# Patient Record
Sex: FEMALE | Race: WHITE | NOT HISPANIC OR LATINO | Employment: OTHER | URBAN - METROPOLITAN AREA
[De-identification: names, ages, dates, MRNs, and addresses within clinical notes are randomized per-mention and may not be internally consistent; named-entity substitution may affect disease eponyms.]

---

## 2023-03-03 LAB
ALANINE AMINOTRANSFERASE (SGPT) (U/L) IN SER/PLAS: 17 U/L (ref 7–45)
ALBUMIN (G/DL) IN SER/PLAS: 4.2 G/DL (ref 3.4–5)
ALKALINE PHOSPHATASE (U/L) IN SER/PLAS: 103 U/L (ref 33–136)
ANION GAP IN SER/PLAS: 15 MMOL/L (ref 10–20)
ASPARTATE AMINOTRANSFERASE (SGOT) (U/L) IN SER/PLAS: 21 U/L (ref 9–39)
BASOPHILS (10*3/UL) IN BLOOD BY AUTOMATED COUNT: 0.03 X10E9/L (ref 0–0.1)
BASOPHILS/100 LEUKOCYTES IN BLOOD BY AUTOMATED COUNT: 0.6 % (ref 0–2)
BILIRUBIN TOTAL (MG/DL) IN SER/PLAS: 1.1 MG/DL (ref 0–1.2)
CALCIDIOL (25 OH VITAMIN D3) (NG/ML) IN SER/PLAS: 49 NG/ML
CALCIUM (MG/DL) IN SER/PLAS: 9.2 MG/DL (ref 8.6–10.6)
CARBON DIOXIDE, TOTAL (MMOL/L) IN SER/PLAS: 30 MMOL/L (ref 21–32)
CHLORIDE (MMOL/L) IN SER/PLAS: 101 MMOL/L (ref 98–107)
CREATININE (MG/DL) IN SER/PLAS: 0.98 MG/DL (ref 0.5–1.05)
EOSINOPHILS (10*3/UL) IN BLOOD BY AUTOMATED COUNT: 0.08 X10E9/L (ref 0–0.4)
EOSINOPHILS/100 LEUKOCYTES IN BLOOD BY AUTOMATED COUNT: 1.5 % (ref 0–6)
ERYTHROCYTE DISTRIBUTION WIDTH (RATIO) BY AUTOMATED COUNT: 13.8 % (ref 11.5–14.5)
ERYTHROCYTE MEAN CORPUSCULAR HEMOGLOBIN CONCENTRATION (G/DL) BY AUTOMATED: 31.2 G/DL (ref 32–36)
ERYTHROCYTE MEAN CORPUSCULAR VOLUME (FL) BY AUTOMATED COUNT: 92 FL (ref 80–100)
ERYTHROCYTES (10*6/UL) IN BLOOD BY AUTOMATED COUNT: 4.28 X10E12/L (ref 4–5.2)
ESTIMATED AVERAGE GLUCOSE FOR HBA1C: 126 MG/DL
GFR FEMALE: 57 ML/MIN/1.73M2
GLUCOSE (MG/DL) IN SER/PLAS: 96 MG/DL (ref 74–99)
HEMATOCRIT (%) IN BLOOD BY AUTOMATED COUNT: 39.4 % (ref 36–46)
HEMOGLOBIN (G/DL) IN BLOOD: 12.3 G/DL (ref 12–16)
HEMOGLOBIN A1C/HEMOGLOBIN TOTAL IN BLOOD: 6 %
IMMATURE GRANULOCYTES/100 LEUKOCYTES IN BLOOD BY AUTOMATED COUNT: 0.2 % (ref 0–0.9)
LEUKOCYTES (10*3/UL) IN BLOOD BY AUTOMATED COUNT: 5.3 X10E9/L (ref 4.4–11.3)
LYMPHOCYTES (10*3/UL) IN BLOOD BY AUTOMATED COUNT: 1.59 X10E9/L (ref 0.8–3)
LYMPHOCYTES/100 LEUKOCYTES IN BLOOD BY AUTOMATED COUNT: 29.9 % (ref 13–44)
MONOCYTES (10*3/UL) IN BLOOD BY AUTOMATED COUNT: 0.37 X10E9/L (ref 0.05–0.8)
MONOCYTES/100 LEUKOCYTES IN BLOOD BY AUTOMATED COUNT: 7 % (ref 2–10)
NEUTROPHILS (10*3/UL) IN BLOOD BY AUTOMATED COUNT: 3.23 X10E9/L (ref 1.6–5.5)
NEUTROPHILS/100 LEUKOCYTES IN BLOOD BY AUTOMATED COUNT: 60.8 % (ref 40–80)
NRBC (PER 100 WBCS) BY AUTOMATED COUNT: 0 /100 WBC (ref 0–0)
PLATELETS (10*3/UL) IN BLOOD AUTOMATED COUNT: 206 X10E9/L (ref 150–450)
POTASSIUM (MMOL/L) IN SER/PLAS: 4.1 MMOL/L (ref 3.5–5.3)
PROTEIN TOTAL: 7.1 G/DL (ref 6.4–8.2)
SODIUM (MMOL/L) IN SER/PLAS: 142 MMOL/L (ref 136–145)
THYROTROPIN (MIU/L) IN SER/PLAS BY DETECTION LIMIT <= 0.05 MIU/L: 1.21 MIU/L (ref 0.44–3.98)
UREA NITROGEN (MG/DL) IN SER/PLAS: 27 MG/DL (ref 6–23)

## 2023-03-16 ENCOUNTER — TELEPHONE (OUTPATIENT)
Dept: PRIMARY CARE | Facility: CLINIC | Age: 83
End: 2023-03-16
Payer: COMMERCIAL

## 2023-03-16 DIAGNOSIS — G47.00 INSOMNIA, UNSPECIFIED TYPE: Primary | ICD-10-CM

## 2023-03-16 RX ORDER — TRAZODONE HYDROCHLORIDE 50 MG/1
50 TABLET ORAL NIGHTLY PRN
Qty: 30 TABLET | Refills: 0 | Status: SHIPPED | OUTPATIENT
Start: 2023-03-16 | End: 2023-04-27 | Stop reason: SINTOL

## 2023-03-16 NOTE — TELEPHONE ENCOUNTER
Pt informed of results.    ----- Message from Marlene Nguyễn PA-C sent at 3/9/2023 10:03 PM EST -----  Regarding: Labs  Labs all look great except:  -hemoglobin A1c is in prediabetic range.  Keep conscious of any sugary drinks/foods in diet.  Continue exercising as tolerated  -kidney function remains decreased but very stable.  Drink adequate water, limit NSAIDs

## 2023-03-21 ENCOUNTER — TELEPHONE (OUTPATIENT)
Dept: PRIMARY CARE | Facility: CLINIC | Age: 83
End: 2023-03-21
Payer: COMMERCIAL

## 2023-03-21 NOTE — TELEPHONE ENCOUNTER
Left pt a detailed message regarding next plan of care.    Pt stated that trazodone is not working.   Can she take melatonin with prescribed med?  Please assist.

## 2023-03-23 ENCOUNTER — TELEPHONE (OUTPATIENT)
Dept: PRIMARY CARE | Facility: CLINIC | Age: 83
End: 2023-03-23
Payer: COMMERCIAL

## 2023-03-23 NOTE — TELEPHONE ENCOUNTER
Pt informed of results.    ----- Message from Marlene Nguyễn PA-C sent at 3/17/2023  5:15 PM EDT -----  Regarding: DEXA results  DEXA bone scan results show normal bone density.   I recommend she continue calcium 600mg + vitamin D 400U twice daily  Participate in weight bearing exercises as tolerated  Thanks

## 2023-04-24 ENCOUNTER — TELEPHONE (OUTPATIENT)
Dept: PRIMARY CARE | Facility: CLINIC | Age: 83
End: 2023-04-24
Payer: COMMERCIAL

## 2023-04-24 NOTE — TELEPHONE ENCOUNTER
Was taking trazadone, however stopped due to stomach pains and lightheadedness.  When she was out of town, she took lorazepam 1/2 at bedtime along with melatonin and it helped her sleep.  Wants to know if you can give her more lorazepam and will only take 1/2 at bedtime.

## 2023-04-25 DIAGNOSIS — G47.00 INSOMNIA, UNSPECIFIED TYPE: Primary | ICD-10-CM

## 2023-04-27 RX ORDER — RAMELTEON 8 MG/1
8 TABLET ORAL NIGHTLY
Qty: 30 TABLET | Refills: 0 | Status: SHIPPED | OUTPATIENT
Start: 2023-04-27 | End: 2023-06-01

## 2023-06-01 ENCOUNTER — OFFICE VISIT (OUTPATIENT)
Dept: PRIMARY CARE | Facility: CLINIC | Age: 83
End: 2023-06-01
Payer: MEDICARE

## 2023-06-01 VITALS
OXYGEN SATURATION: 97 % | HEIGHT: 63 IN | WEIGHT: 185.9 LBS | DIASTOLIC BLOOD PRESSURE: 70 MMHG | HEART RATE: 76 BPM | BODY MASS INDEX: 32.94 KG/M2 | SYSTOLIC BLOOD PRESSURE: 140 MMHG

## 2023-06-01 DIAGNOSIS — I10 BENIGN ESSENTIAL HYPERTENSION: Primary | ICD-10-CM

## 2023-06-01 DIAGNOSIS — E03.9 HYPOTHYROIDISM, UNSPECIFIED TYPE: ICD-10-CM

## 2023-06-01 DIAGNOSIS — G47.00 INSOMNIA, UNSPECIFIED TYPE: ICD-10-CM

## 2023-06-01 DIAGNOSIS — I48.91 ATRIAL FIBRILLATION, UNSPECIFIED TYPE (MULTI): ICD-10-CM

## 2023-06-01 PROBLEM — N39.0 URINARY TRACT INFECTION: Status: ACTIVE | Noted: 2023-06-01

## 2023-06-01 PROBLEM — L60.0 ONYCHOCRYPTOSIS: Status: ACTIVE | Noted: 2017-01-11

## 2023-06-01 PROBLEM — R35.0 INCREASED FREQUENCY OF URINATION: Status: ACTIVE | Noted: 2023-06-01

## 2023-06-01 PROBLEM — H66.90 OTITIS MEDIA: Status: ACTIVE | Noted: 2019-04-15

## 2023-06-01 PROBLEM — E78.00 PURE HYPERCHOLESTEROLEMIA: Status: ACTIVE | Noted: 2019-04-15

## 2023-06-01 PROBLEM — M54.9 BACKACHE: Status: ACTIVE | Noted: 2019-04-15

## 2023-06-01 PROBLEM — R73.9 HYPERGLYCEMIA: Status: ACTIVE | Noted: 2023-06-01

## 2023-06-01 PROBLEM — R30.0 DYSURIA: Status: ACTIVE | Noted: 2019-04-15

## 2023-06-01 PROBLEM — I65.22 STENOSIS OF LEFT INTERNAL CAROTID ARTERY: Status: ACTIVE | Noted: 2023-06-01

## 2023-06-01 PROBLEM — N95.2 ATROPHIC VAGINITIS: Status: ACTIVE | Noted: 2023-06-01

## 2023-06-01 PROBLEM — I65.29 OCCLUSION AND STENOSIS OF UNSPECIFIED CAROTID ARTERY: Status: ACTIVE | Noted: 2023-06-01

## 2023-06-01 PROBLEM — M79.671 PAIN IN RIGHT FOOT: Status: ACTIVE | Noted: 2017-01-11

## 2023-06-01 PROBLEM — M25.559 HIP PAIN: Status: ACTIVE | Noted: 2019-04-15

## 2023-06-01 PROBLEM — R53.83 FATIGUE: Status: ACTIVE | Noted: 2023-06-01

## 2023-06-01 PROBLEM — J01.90 ACUTE SINUSITIS: Status: ACTIVE | Noted: 2019-04-15

## 2023-06-01 PROBLEM — M54.50 LOW BACK PAIN: Status: ACTIVE | Noted: 2023-06-01

## 2023-06-01 PROBLEM — Z95.0 PACEMAKER: Status: ACTIVE | Noted: 2023-06-01

## 2023-06-01 PROBLEM — F41.9 ANXIETY: Status: ACTIVE | Noted: 2023-06-01

## 2023-06-01 PROBLEM — M15.9 GENERALIZED OSTEOARTHRITIS OF MULTIPLE SITES: Status: ACTIVE | Noted: 2023-06-01

## 2023-06-01 PROBLEM — R60.9 EDEMA: Status: ACTIVE | Noted: 2023-06-01

## 2023-06-01 PROBLEM — K59.00 CONSTIPATION: Status: ACTIVE | Noted: 2023-06-01

## 2023-06-01 PROBLEM — R20.2 PARESTHESIA OF HAND: Status: ACTIVE | Noted: 2023-06-01

## 2023-06-01 PROBLEM — R53.83 MALAISE AND FATIGUE: Status: ACTIVE | Noted: 2019-04-15

## 2023-06-01 PROBLEM — M17.9 OSTEOARTHRITIS OF KNEE: Status: ACTIVE | Noted: 2019-04-15

## 2023-06-01 PROBLEM — M79.2 NEURALGIA: Status: ACTIVE | Noted: 2023-06-01

## 2023-06-01 PROBLEM — R55 SYNCOPAL EPISODES: Status: ACTIVE | Noted: 2023-06-01

## 2023-06-01 PROBLEM — R51.9 HEADACHE: Status: ACTIVE | Noted: 2019-04-15

## 2023-06-01 PROBLEM — J02.9 ACUTE PHARYNGITIS: Status: ACTIVE | Noted: 2019-04-15

## 2023-06-01 PROBLEM — R07.9 CHEST PAIN: Status: ACTIVE | Noted: 2019-04-15

## 2023-06-01 PROBLEM — N28.1 RENAL CYST: Status: ACTIVE | Noted: 2023-06-01

## 2023-06-01 PROBLEM — I34.0 MITRAL REGURGITATION: Status: ACTIVE | Noted: 2023-06-01

## 2023-06-01 PROBLEM — R03.0 ELEVATED BLOOD PRESSURE READING: Status: ACTIVE | Noted: 2023-06-01

## 2023-06-01 PROBLEM — I47.19 PAROXYSMAL ATRIAL TACHYCARDIA (CMS-HCC): Status: ACTIVE | Noted: 2023-06-01

## 2023-06-01 PROBLEM — B35.1 ONYCHOMYCOSIS: Status: ACTIVE | Noted: 2023-03-15

## 2023-06-01 PROBLEM — I47.10 SUPRAVENTRICULAR TACHYCARDIA (CMS-HCC): Status: ACTIVE | Noted: 2023-06-01

## 2023-06-01 PROBLEM — R06.00 DYSPNEA: Status: ACTIVE | Noted: 2023-06-01

## 2023-06-01 PROBLEM — H52.7 UNSPECIFIED DISORDER OF REFRACTION: Status: ACTIVE | Noted: 2023-06-01

## 2023-06-01 PROBLEM — R05.9 COUGH: Status: ACTIVE | Noted: 2019-04-15

## 2023-06-01 PROBLEM — B35.1 ONYCHOMYCOSIS DUE TO DERMATOPHYTE: Status: ACTIVE | Noted: 2017-01-11

## 2023-06-01 PROBLEM — I49.5 SINUS BRADY-TACHY SYNDROME (MULTI): Status: ACTIVE | Noted: 2023-06-01

## 2023-06-01 PROBLEM — M81.0 OSTEOPOROSIS: Status: ACTIVE | Noted: 2019-04-15

## 2023-06-01 PROBLEM — M54.2 CERVICALGIA: Status: ACTIVE | Noted: 2023-06-01

## 2023-06-01 PROBLEM — M25.50 JOINT PAIN: Status: ACTIVE | Noted: 2023-06-01

## 2023-06-01 PROBLEM — R10.2 SUPRAPUBIC PRESSURE: Status: ACTIVE | Noted: 2023-06-01

## 2023-06-01 PROBLEM — N18.9 CHRONIC KIDNEY DISEASE: Status: ACTIVE | Noted: 2023-06-01

## 2023-06-01 PROBLEM — J20.9 ACUTE BRONCHITIS: Status: ACTIVE | Noted: 2019-04-15

## 2023-06-01 PROBLEM — I67.9 CEREBRAL VASCULAR INSUFFICIENCY: Status: ACTIVE | Noted: 2023-06-01

## 2023-06-01 PROBLEM — R53.81 MALAISE AND FATIGUE: Status: ACTIVE | Noted: 2019-04-15

## 2023-06-01 PROCEDURE — 1160F RVW MEDS BY RX/DR IN RCRD: CPT | Performed by: PHYSICIAN ASSISTANT

## 2023-06-01 PROCEDURE — 1159F MED LIST DOCD IN RCRD: CPT | Performed by: PHYSICIAN ASSISTANT

## 2023-06-01 PROCEDURE — 3078F DIAST BP <80 MM HG: CPT | Performed by: PHYSICIAN ASSISTANT

## 2023-06-01 PROCEDURE — 99213 OFFICE O/P EST LOW 20 MIN: CPT | Performed by: PHYSICIAN ASSISTANT

## 2023-06-01 PROCEDURE — 3077F SYST BP >= 140 MM HG: CPT | Performed by: PHYSICIAN ASSISTANT

## 2023-06-01 PROCEDURE — 1036F TOBACCO NON-USER: CPT | Performed by: PHYSICIAN ASSISTANT

## 2023-06-01 RX ORDER — CALCIUM CARBONATE 600 MG
1 TABLET ORAL 2 TIMES DAILY
COMMUNITY
Start: 2016-02-01

## 2023-06-01 RX ORDER — ACETAMINOPHEN, DIPHENHYDRAMINE HCL, PHENYLEPHRINE HCL 325; 25; 5 MG/1; MG/1; MG/1
2 TABLET ORAL NIGHTLY
COMMUNITY
Start: 2021-01-11

## 2023-06-01 RX ORDER — LEVOTHYROXINE SODIUM 75 UG/1
75 TABLET ORAL
COMMUNITY
Start: 2021-11-17 | End: 2023-06-01 | Stop reason: SDUPTHER

## 2023-06-01 RX ORDER — ACETAMINOPHEN, DIPHENHYDRAMINE HCL, PHENYLEPHRINE HCL 325; 25; 5 MG/1; MG/1; MG/1
5000 TABLET ORAL
COMMUNITY
Start: 2019-03-18

## 2023-06-01 RX ORDER — APIXABAN 5 MG/1
5 TABLET, FILM COATED ORAL 2 TIMES DAILY
COMMUNITY
Start: 2017-11-20 | End: 2024-03-06 | Stop reason: SDUPTHER

## 2023-06-01 RX ORDER — LEVOTHYROXINE SODIUM 75 UG/1
75 TABLET ORAL
Qty: 90 TABLET | Refills: 1 | Status: SHIPPED | OUTPATIENT
Start: 2023-06-01 | End: 2023-11-01 | Stop reason: SDUPTHER

## 2023-06-01 RX ORDER — ATORVASTATIN CALCIUM 20 MG/1
20 TABLET, FILM COATED ORAL DAILY
COMMUNITY
Start: 2013-05-22 | End: 2023-12-07 | Stop reason: SDUPTHER

## 2023-06-01 RX ORDER — AMIODARONE HYDROCHLORIDE 200 MG/1
200 TABLET ORAL DAILY
COMMUNITY
Start: 2018-06-12 | End: 2024-04-22

## 2023-06-01 RX ORDER — DOCUSATE SODIUM 100 MG/1
1 CAPSULE, LIQUID FILLED ORAL 2 TIMES DAILY PRN
COMMUNITY
Start: 2022-03-31

## 2023-06-01 RX ORDER — MULTIVIT-MIN/FA/LYCOPEN/LUTEIN .4-300-25
1 TABLET ORAL DAILY
COMMUNITY
Start: 2015-05-05

## 2023-06-01 RX ORDER — TORSEMIDE 20 MG/1
1.5 TABLET ORAL DAILY
COMMUNITY
Start: 2018-05-15 | End: 2024-04-15 | Stop reason: SDUPTHER

## 2023-06-01 RX ORDER — METOPROLOL SUCCINATE 25 MG/1
25 TABLET, EXTENDED RELEASE ORAL DAILY
COMMUNITY
Start: 2019-02-09 | End: 2024-02-05 | Stop reason: SDUPTHER

## 2023-06-01 RX ORDER — ACETAMINOPHEN 500 MG
2 TABLET ORAL DAILY
COMMUNITY

## 2023-06-01 RX ORDER — CANDESARTAN 32 MG/1
32 TABLET ORAL DAILY
COMMUNITY
Start: 2019-09-25

## 2023-06-01 ASSESSMENT — PATIENT HEALTH QUESTIONNAIRE - PHQ9
1. LITTLE INTEREST OR PLEASURE IN DOING THINGS: NOT AT ALL
SUM OF ALL RESPONSES TO PHQ9 QUESTIONS 1 AND 2: 0
2. FEELING DOWN, DEPRESSED OR HOPELESS: NOT AT ALL

## 2023-06-01 NOTE — PROGRESS NOTES
"Subjective   Laurie Ozuna is a 82 y.o. female who presents for Follow-up.  HPI Laurie Ozuna is a 82 y.o. female presenting for a follow up. Generally doing well. Currently c/o:    L hip pain: states she had some difficulty with L hip pain a few days ago after wearing sandals. She states the sandals have a poor arch, unlike her normal shoes. After switching back to her normal shoes the next day the pain resolved. She notes her son is getting  next weekend and she will have to wear the sandals again unfortunately.     HTN, HLD, Afib: compliant with current regimen, including eliquis. Follows with cardiology, last seen 3/27/23.  Denies any headache, dizziness, chest pain, SOB/PATTERSON, palpitations, LE edema. No abnormal bleeding or bruising.     Hypothyroidism: stable on levothyroxine 75mcg.     Insomnia: Recall she failed trazodone after developing abdominal cramping. She was called in doxepin and ramelteon to trial, but they were both too expensive. She was previously on lorazepam, however we discontinued it d/t long term use. States she is currently taking 3, 10mg melatonin gummies per night (30mg total). We discussed that the max dose is 10mg and that she is at risk for overdose.     12 point ROS reviewed and negative other than as stated in HPI    /70   Pulse 76   Ht 1.6 m (5' 3\")   Wt 84.3 kg (185 lb 14.4 oz)   SpO2 97%   BMI 32.93 kg/m²   Objective   Physical Exam  Vitals reviewed.   Constitutional:       General: She is not in acute distress.     Appearance: Normal appearance. She is not ill-appearing.   HENT:      Head: Normocephalic and atraumatic.   Eyes:      General: No scleral icterus.     Extraocular Movements: Extraocular movements intact.      Conjunctiva/sclera: Conjunctivae normal.      Pupils: Pupils are equal, round, and reactive to light.   Cardiovascular:      Rate and Rhythm: Normal rate and regular rhythm.      Heart sounds: Normal heart sounds. No murmur heard.     No friction " rub. No gallop.   Pulmonary:      Effort: Pulmonary effort is normal. No respiratory distress.      Breath sounds: Normal breath sounds. No stridor. No wheezing, rhonchi or rales.   Musculoskeletal:      Cervical back: Normal range of motion.      Right lower leg: No edema.      Left lower leg: No edema.   Skin:     General: Skin is warm and dry.   Neurological:      Mental Status: She is alert and oriented to person, place, and time. Mental status is at baseline.      Cranial Nerves: No cranial nerve deficit.      Gait: Gait normal.   Psychiatric:         Mood and Affect: Mood normal.         Behavior: Behavior normal.         Assessment/Plan   Problem List Items Addressed This Visit       Benign essential hypertension - Primary     Acceptable for age. Continue current medications unchanged. Follow a strict DASH diet.          Hypothyroidism     Continue levothyroxine 75mcg.          Relevant Medications    levothyroxine (Synthroid, Levoxyl) 75 mcg tablet    Insomnia     STOP taking melatonin 30mg. Discussed max melatonin is 10mg. Offered trial of sertraline or referral to sleep medicine, declined for now.          Atrial fibrillation (CMS/HCC)     Stable on current regimen. Continue all medications, including eliquis. Follow with cardiology         Relevant Medications    metoprolol succinate XL (Toprol-XL) 25 mg 24 hr tablet        Follow up in 6 months or sooner as needed

## 2023-06-04 PROBLEM — R10.2 SUPRAPUBIC PRESSURE: Status: RESOLVED | Noted: 2023-06-01 | Resolved: 2023-06-04

## 2023-06-04 PROBLEM — M54.9 BACKACHE: Status: RESOLVED | Noted: 2019-04-15 | Resolved: 2023-06-04

## 2023-06-04 PROBLEM — R03.0 ELEVATED BLOOD PRESSURE READING: Status: RESOLVED | Noted: 2023-06-01 | Resolved: 2023-06-04

## 2023-06-04 PROBLEM — J01.90 ACUTE SINUSITIS: Status: RESOLVED | Noted: 2019-04-15 | Resolved: 2023-06-04

## 2023-06-04 PROBLEM — R30.0 DYSURIA: Status: RESOLVED | Noted: 2019-04-15 | Resolved: 2023-06-04

## 2023-06-04 PROBLEM — J02.9 ACUTE PHARYNGITIS: Status: RESOLVED | Noted: 2019-04-15 | Resolved: 2023-06-04

## 2023-06-04 PROBLEM — N39.0 URINARY TRACT INFECTION: Status: RESOLVED | Noted: 2023-06-01 | Resolved: 2023-06-04

## 2023-06-04 PROBLEM — J20.9 ACUTE BRONCHITIS: Status: RESOLVED | Noted: 2019-04-15 | Resolved: 2023-06-04

## 2023-06-04 NOTE — ASSESSMENT & PLAN NOTE
STOP taking melatonin 30mg. Discussed max melatonin is 10mg. Offered trial of sertraline or referral to sleep medicine, declined for now.

## 2023-06-05 DIAGNOSIS — E03.9 HYPOTHYROIDISM, UNSPECIFIED TYPE: ICD-10-CM

## 2023-06-05 RX ORDER — LEVOTHYROXINE SODIUM 75 UG/1
75 TABLET ORAL
Qty: 90 TABLET | Refills: 1 | OUTPATIENT
Start: 2023-06-05

## 2023-09-11 PROBLEM — H02.831 DERMATOCHALASIS OF RIGHT UPPER EYELID: Status: ACTIVE | Noted: 2023-09-11

## 2023-09-11 PROBLEM — H04.129 TEAR FILM INSUFFICIENCY: Status: ACTIVE | Noted: 2023-09-11

## 2023-09-11 PROBLEM — Z96.1 ARTIFICIAL LENS PRESENT: Status: ACTIVE | Noted: 2023-09-11

## 2023-09-11 PROBLEM — H35.379 EPIRETINAL MEMBRANE: Status: ACTIVE | Noted: 2023-09-11

## 2023-09-11 PROBLEM — H02.834 DERMATOCHALASIS OF LEFT UPPER EYELID: Status: ACTIVE | Noted: 2023-09-11

## 2023-09-16 PROBLEM — E66.9 CLASS 1 OBESITY WITH BODY MASS INDEX (BMI) OF 32.0 TO 32.9 IN ADULT: Status: ACTIVE | Noted: 2023-09-16

## 2023-09-16 PROBLEM — E66.811 CLASS 1 OBESITY WITH BODY MASS INDEX (BMI) OF 32.0 TO 32.9 IN ADULT: Status: ACTIVE | Noted: 2023-09-16

## 2023-09-16 PROBLEM — E78.00 HYPERCHOLESTEREMIA: Status: ACTIVE | Noted: 2023-09-16

## 2023-09-16 RX ORDER — BISACODYL 10 MG/1
10 SUPPOSITORY RECTAL DAILY
COMMUNITY
End: 2023-11-01 | Stop reason: SDUPTHER

## 2023-09-16 RX ORDER — ESTRADIOL 0.1 MG/G
2 CREAM VAGINAL DAILY
COMMUNITY

## 2023-09-16 RX ORDER — DOXEPIN 3 MG/1
1 TABLET, FILM COATED ORAL NIGHTLY
COMMUNITY
End: 2023-11-01 | Stop reason: ALTCHOICE

## 2023-09-16 RX ORDER — LIDOCAINE 50 MG/G
1 PATCH TOPICAL DAILY
COMMUNITY

## 2023-10-18 ENCOUNTER — HOSPITAL ENCOUNTER (OUTPATIENT)
Dept: CARDIOLOGY | Facility: CLINIC | Age: 83
Discharge: HOME | End: 2023-10-18
Payer: MEDICARE

## 2023-10-18 DIAGNOSIS — I34.0 MITRAL VALVE INSUFFICIENCY, UNSPECIFIED ETIOLOGY: ICD-10-CM

## 2023-10-18 PROCEDURE — 93306 TTE W/DOPPLER COMPLETE: CPT | Performed by: INTERNAL MEDICINE

## 2023-10-18 PROCEDURE — 93306 TTE W/DOPPLER COMPLETE: CPT

## 2023-10-21 LAB — EJECTION FRACTION APICAL 4 CHAMBER: 58.1

## 2023-10-23 ENCOUNTER — HOSPITAL ENCOUNTER (OUTPATIENT)
Dept: CARDIOLOGY | Facility: CLINIC | Age: 83
Discharge: HOME | End: 2023-10-23
Payer: COMMERCIAL

## 2023-10-23 DIAGNOSIS — I49.5 SICK SINUS SYNDROME (MULTI): ICD-10-CM

## 2023-10-23 DIAGNOSIS — Z95.0 PRESENCE OF CARDIAC PACEMAKER: ICD-10-CM

## 2023-11-01 ENCOUNTER — OFFICE VISIT (OUTPATIENT)
Dept: PRIMARY CARE | Facility: CLINIC | Age: 83
End: 2023-11-01
Payer: MEDICARE

## 2023-11-01 ENCOUNTER — HOSPITAL ENCOUNTER (OUTPATIENT)
Dept: RADIOLOGY | Facility: CLINIC | Age: 83
Discharge: HOME | End: 2023-11-01
Payer: MEDICARE

## 2023-11-01 ENCOUNTER — LAB (OUTPATIENT)
Dept: LAB | Facility: LAB | Age: 83
End: 2023-11-01
Payer: COMMERCIAL

## 2023-11-01 VITALS
SYSTOLIC BLOOD PRESSURE: 143 MMHG | WEIGHT: 192 LBS | HEART RATE: 61 BPM | OXYGEN SATURATION: 98 % | BODY MASS INDEX: 34.01 KG/M2 | DIASTOLIC BLOOD PRESSURE: 79 MMHG

## 2023-11-01 DIAGNOSIS — E78.00 HYPERCHOLESTEREMIA: ICD-10-CM

## 2023-11-01 DIAGNOSIS — K59.00 CONSTIPATION, UNSPECIFIED CONSTIPATION TYPE: ICD-10-CM

## 2023-11-01 DIAGNOSIS — N18.31 STAGE 3A CHRONIC KIDNEY DISEASE (MULTI): ICD-10-CM

## 2023-11-01 DIAGNOSIS — R73.9 HYPERGLYCEMIA: ICD-10-CM

## 2023-11-01 DIAGNOSIS — R06.09 EXERTIONAL DYSPNEA: ICD-10-CM

## 2023-11-01 DIAGNOSIS — E03.9 HYPOTHYROIDISM, UNSPECIFIED TYPE: ICD-10-CM

## 2023-11-01 DIAGNOSIS — I10 BENIGN ESSENTIAL HYPERTENSION: ICD-10-CM

## 2023-11-01 DIAGNOSIS — I10 BENIGN ESSENTIAL HYPERTENSION: Primary | ICD-10-CM

## 2023-11-01 LAB
BASOPHILS # BLD AUTO: 0.04 X10*3/UL (ref 0–0.1)
BASOPHILS NFR BLD AUTO: 0.6 %
EOSINOPHIL # BLD AUTO: 0.07 X10*3/UL (ref 0–0.4)
EOSINOPHIL NFR BLD AUTO: 1.1 %
ERYTHROCYTE [DISTWIDTH] IN BLOOD BY AUTOMATED COUNT: 14.2 % (ref 11.5–14.5)
EST. AVERAGE GLUCOSE BLD GHB EST-MCNC: 126 MG/DL
HBA1C MFR BLD: 6 %
HCT VFR BLD AUTO: 39.6 % (ref 36–46)
HGB BLD-MCNC: 12 G/DL (ref 12–16)
IMM GRANULOCYTES # BLD AUTO: 0.02 X10*3/UL (ref 0–0.5)
IMM GRANULOCYTES NFR BLD AUTO: 0.3 % (ref 0–0.9)
LYMPHOCYTES # BLD AUTO: 1.58 X10*3/UL (ref 0.8–3)
LYMPHOCYTES NFR BLD AUTO: 24.5 %
MCH RBC QN AUTO: 28.4 PG (ref 26–34)
MCHC RBC AUTO-ENTMCNC: 30.3 G/DL (ref 32–36)
MCV RBC AUTO: 94 FL (ref 80–100)
MONOCYTES # BLD AUTO: 0.64 X10*3/UL (ref 0.05–0.8)
MONOCYTES NFR BLD AUTO: 9.9 %
NEUTROPHILS # BLD AUTO: 4.09 X10*3/UL (ref 1.6–5.5)
NEUTROPHILS NFR BLD AUTO: 63.6 %
NRBC BLD-RTO: 0 /100 WBCS (ref 0–0)
PLATELET # BLD AUTO: 219 X10*3/UL (ref 150–450)
RBC # BLD AUTO: 4.23 X10*6/UL (ref 4–5.2)
WBC # BLD AUTO: 6.4 X10*3/UL (ref 4.4–11.3)

## 2023-11-01 PROCEDURE — 83036 HEMOGLOBIN GLYCOSYLATED A1C: CPT

## 2023-11-01 PROCEDURE — 80061 LIPID PANEL: CPT

## 2023-11-01 PROCEDURE — 99214 OFFICE O/P EST MOD 30 MIN: CPT | Performed by: PHYSICIAN ASSISTANT

## 2023-11-01 PROCEDURE — G0008 ADMIN INFLUENZA VIRUS VAC: HCPCS | Performed by: PHYSICIAN ASSISTANT

## 2023-11-01 PROCEDURE — 1160F RVW MEDS BY RX/DR IN RCRD: CPT | Performed by: PHYSICIAN ASSISTANT

## 2023-11-01 PROCEDURE — 36415 COLL VENOUS BLD VENIPUNCTURE: CPT

## 2023-11-01 PROCEDURE — 1159F MED LIST DOCD IN RCRD: CPT | Performed by: PHYSICIAN ASSISTANT

## 2023-11-01 PROCEDURE — 3078F DIAST BP <80 MM HG: CPT | Performed by: PHYSICIAN ASSISTANT

## 2023-11-01 PROCEDURE — 85025 COMPLETE CBC W/AUTO DIFF WBC: CPT

## 2023-11-01 PROCEDURE — 1126F AMNT PAIN NOTED NONE PRSNT: CPT | Performed by: PHYSICIAN ASSISTANT

## 2023-11-01 PROCEDURE — 84443 ASSAY THYROID STIM HORMONE: CPT

## 2023-11-01 PROCEDURE — 71046 X-RAY EXAM CHEST 2 VIEWS: CPT | Performed by: RADIOLOGY

## 2023-11-01 PROCEDURE — 3077F SYST BP >= 140 MM HG: CPT | Performed by: PHYSICIAN ASSISTANT

## 2023-11-01 PROCEDURE — 80053 COMPREHEN METABOLIC PANEL: CPT

## 2023-11-01 PROCEDURE — 90662 IIV NO PRSV INCREASED AG IM: CPT | Performed by: PHYSICIAN ASSISTANT

## 2023-11-01 PROCEDURE — 71046 X-RAY EXAM CHEST 2 VIEWS: CPT

## 2023-11-01 PROCEDURE — 1036F TOBACCO NON-USER: CPT | Performed by: PHYSICIAN ASSISTANT

## 2023-11-01 RX ORDER — LEVOTHYROXINE SODIUM 75 UG/1
75 TABLET ORAL
Qty: 90 TABLET | Refills: 1 | Status: SHIPPED | OUTPATIENT
Start: 2023-11-01 | End: 2024-05-06 | Stop reason: SDUPTHER

## 2023-11-01 RX ORDER — BISACODYL 10 MG/1
10 SUPPOSITORY RECTAL DAILY
Qty: 12 SUPPOSITORY | Refills: 2 | Status: SHIPPED | OUTPATIENT
Start: 2023-11-01

## 2023-11-01 NOTE — PROGRESS NOTES
Subjective   Lauriewarner Ozuna is a 83 y.o. female who presents for a follow up. Generally doing well. Currently c/o:    Dyspnea on exertion: intermittently occurring for the past week. Currently asymptomatic. Noticed symptoms specifically with the weather change. Denies any fevers, chills, sick contacts, dizziness, cough, sputum production, wheezing, chest pain, palpitations, dyspnea at rest. Endorsed having an episode of chest tightness last week, did not seek care. Lasted a few minutes, felt similar to heartburn. She took TUMS and it resolved. No diaphoresis, arm pain, jaw pain, associated dyspnea, orthopnea, LE edema. No abnormal bleeding or bruising. Recently had an echocardiogram performed, results in EMR. Has appt next week with cardiology to review.     Feeling off balanced: occurs intermittently for the past year when she walks. She has a history of vertigo, but states it is not the same. Not a room spinning dizziness, just off balance feeling. No associated HA, tinnitus, hearing or vision changes/loss, memory changes, neuro deficits. She does not notice a difference with taking meclizine 12.5mg. Has not checked her BP when it occurs. No urinary complaints.     HTN, HLD, Afib: compliant with current regimen, including eliquis. Does check BP at home, 160/80 last week. Follows with cardiology    Hypothyroidism: stable on levothyroxine 75mcg. Endorses chronic constipation. No palpitations, abnormal weight changes, hot/ cold intolerance.     CKD, stage IIIa: not on ACE/ARB d/t allergy. No SGLT2i    Insomnia: Recall she failed trazodone after developing abdominal cramping. She was called in doxepin and ramelteon to trial, but they were both too expensive. She was previously on lorazepam, however we discontinued it d/t long term use. States she goes to bed late, falls asleep for 2-5hrs, then is wide awake. Usually when sleeping she tosses and turns often. Not taking any medications currently. Not interested in  meeting with sleep medicine.     Health maintenance:  Immunizations:  -Flu: obtain high-dose today, 11/1/23   -Pneumococcal: UTD  -Shingrix: obtain from local pharmacy  -Tdap: recommended from pharmacy   Mammogram UTD (last 3/14/23)   Colon cancer screening - not indicated d/t age  DEXA DUE (last 5/29/20) - ordered     Last MCR/ CPE: 3/2/23 (MCR ADV)    12 point ROS reviewed and negative other than as stated in HPI    /79   Pulse 61   Wt 87.1 kg (192 lb)   SpO2 98%   BMI 34.01 kg/m²   Objective   Physical Exam  Vitals reviewed.   Constitutional:       General: She is not in acute distress.     Appearance: Normal appearance. She is not ill-appearing.   HENT:      Head: Normocephalic and atraumatic.   Eyes:      General: No scleral icterus.     Extraocular Movements: Extraocular movements intact.      Conjunctiva/sclera: Conjunctivae normal.      Pupils: Pupils are equal, round, and reactive to light.   Cardiovascular:      Rate and Rhythm: Normal rate and regular rhythm.      Heart sounds: Normal heart sounds. No murmur heard.     No friction rub. No gallop.   Pulmonary:      Effort: Pulmonary effort is normal. No respiratory distress.      Breath sounds: Normal breath sounds. No stridor. No wheezing, rhonchi or rales.   Musculoskeletal:      Cervical back: Normal range of motion.      Right lower leg: No edema.      Left lower leg: No edema.   Skin:     General: Skin is warm and dry.   Neurological:      Mental Status: She is alert and oriented to person, place, and time. Mental status is at baseline.      Cranial Nerves: No cranial nerve deficit.      Gait: Gait normal.   Psychiatric:         Mood and Affect: Mood normal.         Behavior: Behavior normal.         Assessment/Plan   Problem List Items Addressed This Visit       Benign essential hypertension - Primary     Acceptable for age. Continue current medications unchanged. Follow a strict DASH diet.          Relevant Orders    Comprehensive  metabolic panel (Completed)    CBC and Auto Differential (Completed)    Constipation     Take metamucil or miralax PRN. Increase fiber and water intake. Exercise for 30 mins daily as tolerated         Relevant Medications    bisacodyl (Dulcolax, bisacodyl,) 10 mg suppository    Hyperglycemia     Hemoglobin A1c ordered. Cut back on carbohydrates and sugary drinks/foods         Relevant Orders    Hemoglobin A1c (Completed)    Hypothyroidism     Continue levothyroxine 75mcg. TFTs ordered         Relevant Medications    levothyroxine (Synthroid, Levoxyl) 75 mcg tablet    Other Relevant Orders    Tsh With Reflex To Free T4 If Abnormal (Completed)    Hypercholesteremia     Continue atorvastatin 20mg. Follow a mediterranean style diet and exercise as tolerated          Relevant Orders    Lipid panel (Completed)    Exertional dyspnea     Currently asymptomatic. Vital signs stable. Chest xray ordered. Review echocardiogram with cardiology and whether any intervention is indicated. Labs ordered.     Discussed strict precautions and to go to the ED with any worsening.          Relevant Orders    XR chest 2 views (Completed)    Stage 3a chronic kidney disease (CMS/HCC)     ACE/ARB contraindicated d/t allergy. No SGLT2 inhibitor d/t cost. Encouraged strict control of BP. Avoid NSAIDs. Drink plenty of water.            Follow up in 6 months or sooner as needed

## 2023-11-02 LAB
ALBUMIN SERPL BCP-MCNC: 4.3 G/DL (ref 3.4–5)
ALP SERPL-CCNC: 99 U/L (ref 33–136)
ALT SERPL W P-5'-P-CCNC: 19 U/L (ref 7–45)
ANION GAP SERPL CALC-SCNC: 15 MMOL/L (ref 10–20)
AST SERPL W P-5'-P-CCNC: 20 U/L (ref 9–39)
BILIRUB SERPL-MCNC: 0.9 MG/DL (ref 0–1.2)
BUN SERPL-MCNC: 25 MG/DL (ref 6–23)
CALCIUM SERPL-MCNC: 9.5 MG/DL (ref 8.6–10.6)
CHLORIDE SERPL-SCNC: 103 MMOL/L (ref 98–107)
CHOLEST SERPL-MCNC: 130 MG/DL (ref 0–199)
CHOLESTEROL/HDL RATIO: 2.2
CO2 SERPL-SCNC: 29 MMOL/L (ref 21–32)
CREAT SERPL-MCNC: 1.12 MG/DL (ref 0.5–1.05)
GFR SERPL CREATININE-BSD FRML MDRD: 49 ML/MIN/1.73M*2
GLUCOSE SERPL-MCNC: 114 MG/DL (ref 74–99)
HDLC SERPL-MCNC: 59.1 MG/DL
LDLC SERPL CALC-MCNC: 56 MG/DL
NON HDL CHOLESTEROL: 71 MG/DL (ref 0–149)
POTASSIUM SERPL-SCNC: 5 MMOL/L (ref 3.5–5.3)
PROT SERPL-MCNC: 7.4 G/DL (ref 6.4–8.2)
SODIUM SERPL-SCNC: 142 MMOL/L (ref 136–145)
TRIGL SERPL-MCNC: 77 MG/DL (ref 0–149)
TSH SERPL-ACNC: 1.11 MIU/L (ref 0.44–3.98)
VLDL: 15 MG/DL (ref 0–40)

## 2023-11-04 PROBLEM — R42 LIGHTHEADED: Status: ACTIVE | Noted: 2023-11-04

## 2023-11-04 PROBLEM — R06.09 EXERTIONAL DYSPNEA: Status: ACTIVE | Noted: 2023-11-04

## 2023-11-04 PROBLEM — N18.31 STAGE 3A CHRONIC KIDNEY DISEASE (MULTI): Status: ACTIVE | Noted: 2023-11-04

## 2023-11-05 NOTE — ASSESSMENT & PLAN NOTE
ACE/ARB contraindicated d/t allergy. No SGLT2 inhibitor d/t cost. Encouraged strict control of BP. Avoid NSAIDs. Drink plenty of water.

## 2023-11-05 NOTE — ASSESSMENT & PLAN NOTE
Vital signs stable. Discussed a differential including but not limited to electrolyte abnormality, cardiac valvular abnormality, medication side effect, UTI, etc. Labs ordered. Review echocardiogram with cardiology and whether any intervention is indicated.

## 2023-11-05 NOTE — ASSESSMENT & PLAN NOTE
Take metamucil or miralax PRN. Increase fiber and water intake. Exercise for 30 mins daily as tolerated

## 2023-11-05 NOTE — RESULT ENCOUNTER NOTE
Kidney function is slightly decreased from last draw. Avoid NSAID use. Drink adequate water. I recommend strict control of BP    Hemoglobin A1c is in the prediabetic range. Cut back on carbohydrates and sugary drinks/foods.     Remainder of labs look stable

## 2023-11-05 NOTE — ASSESSMENT & PLAN NOTE
Currently asymptomatic. Vital signs stable. Chest xray ordered. Review echocardiogram with cardiology and whether any intervention is indicated. Labs ordered.     Discussed strict precautions and to go to the ED with any worsening.

## 2023-11-13 ENCOUNTER — OFFICE VISIT (OUTPATIENT)
Dept: CARDIOLOGY | Facility: CLINIC | Age: 83
End: 2023-11-13
Payer: MEDICARE

## 2023-11-13 VITALS
BODY MASS INDEX: 34.37 KG/M2 | WEIGHT: 194 LBS | HEART RATE: 85 BPM | OXYGEN SATURATION: 98 % | DIASTOLIC BLOOD PRESSURE: 57 MMHG | SYSTOLIC BLOOD PRESSURE: 130 MMHG

## 2023-11-13 DIAGNOSIS — Z95.0 PRESENCE OF CARDIAC PACEMAKER: Primary | ICD-10-CM

## 2023-11-13 DIAGNOSIS — I49.5 SICK SINUS SYNDROME (MULTI): ICD-10-CM

## 2023-11-13 DIAGNOSIS — I34.0 MITRAL VALVE INSUFFICIENCY, UNSPECIFIED ETIOLOGY: ICD-10-CM

## 2023-11-13 PROCEDURE — 93005 ELECTROCARDIOGRAM TRACING: CPT | Performed by: NURSE PRACTITIONER

## 2023-11-13 PROCEDURE — 1036F TOBACCO NON-USER: CPT | Performed by: NURSE PRACTITIONER

## 2023-11-13 PROCEDURE — 93010 ELECTROCARDIOGRAM REPORT: CPT | Performed by: INTERNAL MEDICINE

## 2023-11-13 PROCEDURE — 1159F MED LIST DOCD IN RCRD: CPT | Performed by: NURSE PRACTITIONER

## 2023-11-13 PROCEDURE — 1126F AMNT PAIN NOTED NONE PRSNT: CPT | Performed by: NURSE PRACTITIONER

## 2023-11-13 PROCEDURE — 3075F SYST BP GE 130 - 139MM HG: CPT | Performed by: NURSE PRACTITIONER

## 2023-11-13 PROCEDURE — 99214 OFFICE O/P EST MOD 30 MIN: CPT | Performed by: NURSE PRACTITIONER

## 2023-11-13 PROCEDURE — 3078F DIAST BP <80 MM HG: CPT | Performed by: NURSE PRACTITIONER

## 2023-11-13 PROCEDURE — 1160F RVW MEDS BY RX/DR IN RCRD: CPT | Performed by: NURSE PRACTITIONER

## 2023-11-13 RX ORDER — POTASSIUM CHLORIDE 20 MEQ/1
20 TABLET, EXTENDED RELEASE ORAL DAILY
Qty: 30 TABLET | Refills: 1 | Status: SHIPPED | OUTPATIENT
Start: 2023-11-13 | End: 2023-11-15

## 2023-11-13 ASSESSMENT — ENCOUNTER SYMPTOMS
DYSPNEA ON EXERTION: 1
RESPIRATORY NEGATIVE: 1
IRREGULAR HEARTBEAT: 1
WEIGHT GAIN: 1

## 2023-11-13 ASSESSMENT — PAIN SCALES - GENERAL: PAINLEVEL: 0-NO PAIN

## 2023-11-13 NOTE — PROGRESS NOTES
Subjective   Laurie KIMBERLYN Ozuna is a 83 y.o. female.    Chief Complaint:  Shortness of breath, weight gain    Mrs. Ozuna is in for follow-up.  This is a 4-month appointment.  She is seen in collaboration with Dr. Johnson.  She did have an echocardiogram a few weeks ago.  She presents with a 10 pound weight gain since June.  She is also noting some fullness in her chest as well as shortness of breath with activity.  She notes that the symptoms are similar to how she feels when she is in atrial fibrillation.  A device check in September did note more than 20 AT/AF episodes with a duration of 12 to 18 seconds each at a burden of less than 1%.  There was a 4.7% PVC burden.  Results of a repeat device check in October is unavailable for review.  She has noted some palpitations as well.  She is compliant with her medication and has been taking an additional one half torsemide tablets 2 times per week for the last 3 weeks.  This is helpful the day that she takes the medication but not for any duration of time.  She is using Gatorade for electrolyte replacement and drinking what sounds like more than 48 ounces of fluid daily.        Review of Systems   Constitutional: Positive for weight gain.   Cardiovascular:  Positive for dyspnea on exertion and irregular heartbeat.   Respiratory: Negative.     All other systems reviewed and are negative.      Objective   Vitals reviewed.   Constitutional:       Appearance: Healthy appearance. Not in distress.   Neck:      Vascular: No JVR. JVD elevated.   Pulmonary:      Effort: Pulmonary effort is normal.      Breath sounds: Normal breath sounds. No wheezing. No rhonchi. No rales.   Chest:      Chest wall: Not tender to palpatation.   Cardiovascular:      Normal rate. Regular rhythm. Normal S1. Normal S2.       Murmurs: There is no murmur.      No gallop.  No click. No rub.   Edema:     Ankle: bilateral trace edema of the ankle.  Abdominal:      Tenderness: There is no abdominal  tenderness.   Musculoskeletal: Normal range of motion.         General: No tenderness.      Cervical back: Normal range of motion. Skin:     General: Skin is warm and dry.   Neurological:      General: No focal deficit present.      Mental Status: Alert and oriented to person, place and time.     ECG obtained and reviewed, shows a ventricular paced rhythm with occasional PVC and rate of 62 bpm underlying rhythm is difficult to interpret but possibly atrial fibrillation    Echocardiogram obtained 10/2023 and review showed left ventricular systolic function low normal with a 55% estimated ejection fraction, currently dilated atria bilaterally, mild to moderate MR, moderate to severe TR with moderate to severe elevated RVSP and moderate to severely elevated PAP, estimated PASP is 60 mmHg.    Lab Review:   Lab Results   Component Value Date     11/01/2023    K 5.0 11/01/2023     11/01/2023    CO2 29 11/01/2023    BUN 25 (H) 11/01/2023    CREATININE 1.12 (H) 11/01/2023    GLUCOSE 114 (H) 11/01/2023    CALCIUM 9.5 11/01/2023     Lab Results   Component Value Date    WBC 6.4 11/01/2023    HGB 12.0 11/01/2023    HCT 39.6 11/01/2023    MCV 94 11/01/2023     11/01/2023     Lab Results   Component Value Date    CHOL 130 11/01/2023    TRIG 77 11/01/2023    HDL 59.1 11/01/2023       Assessment/Plan     In summary, Mrs Ozuna is a pleasant 83 year old female with a medical history significant for AVNRT ablation in 2006, atrial fibrillation with multiple DCCV, permanent pacemaker placement 2018, mild to moderate MR and moderate PTHN by echo 9/2023 with preserved LV and RV function, hypertension and hyperlipidemia. Nuclear stress test 5/2016 showed no ischemia. She has been compliant with both amiodarone and Eliquis anticoagulation however presents today with volume overload, and palpitations.  Her ECG suggests underlying atrial fibrillation and recent pacemaker evaluation shows paroxysms of AF.  I will try and  get the report from October.  Other vital signs today are stable.  Her weight is up about 10 pounds in 4 months.  I would like her to increase torsemide to 2 tablets daily and will add a 20 mEq potassium supplement daily.  I would like her to obtain labs in a week or 2 and follow-up in 3 weeks.  Should she remain in atrial fibrillation we will plan on repeat cardioversion.  Recent TFTs and LFTs are acceptable.  She does know to call with any interim questions concerns or worsening of her symptoms.

## 2023-11-13 NOTE — PATIENT INSTRUCTIONS
INCREASE TORSEMIDE TO 2 TABLETS DAILY    START POTASSIUM 20 MEQ DAILY    CHECK LABS NEXT WEEK    CONTINUE ALL OTHER MEDICATION    CALL FOR ANY CONCERNS    RETURN IN 3 WEEKS

## 2023-11-14 DIAGNOSIS — I49.5 SICK SINUS SYNDROME (MULTI): ICD-10-CM

## 2023-11-15 RX ORDER — POTASSIUM CHLORIDE 20 MEQ/1
TABLET, EXTENDED RELEASE ORAL
Qty: 90 TABLET | Refills: 1 | Status: SHIPPED | OUTPATIENT
Start: 2023-11-15 | End: 2023-12-18

## 2023-11-17 LAB
ATRIAL RATE: 468 BPM
Q ONSET: 185 MS
QRS COUNT: 11 BEATS
QRS DURATION: 192 MS
QT INTERVAL: 506 MS
QTC CALCULATION(BAZETT): 513 MS
QTC FREDERICIA: 511 MS
R AXIS: -71 DEGREES
T AXIS: 86 DEGREES
T OFFSET: 438 MS
VENTRICULAR RATE: 62 BPM

## 2023-11-20 ENCOUNTER — LAB (OUTPATIENT)
Dept: LAB | Facility: LAB | Age: 83
End: 2023-11-20
Payer: MEDICARE

## 2023-11-20 DIAGNOSIS — Z95.0 PRESENCE OF CARDIAC PACEMAKER: ICD-10-CM

## 2023-11-20 LAB
ANION GAP SERPL CALC-SCNC: 13 MMOL/L (ref 10–20)
BUN SERPL-MCNC: 26 MG/DL (ref 6–23)
CALCIUM SERPL-MCNC: 9.5 MG/DL (ref 8.6–10.6)
CHLORIDE SERPL-SCNC: 101 MMOL/L (ref 98–107)
CO2 SERPL-SCNC: 34 MMOL/L (ref 21–32)
CREAT SERPL-MCNC: 1.41 MG/DL (ref 0.5–1.05)
GFR SERPL CREATININE-BSD FRML MDRD: 37 ML/MIN/1.73M*2
GLUCOSE SERPL-MCNC: 108 MG/DL (ref 74–99)
POTASSIUM SERPL-SCNC: 5 MMOL/L (ref 3.5–5.3)
SODIUM SERPL-SCNC: 143 MMOL/L (ref 136–145)

## 2023-11-20 PROCEDURE — 36415 COLL VENOUS BLD VENIPUNCTURE: CPT

## 2023-11-20 PROCEDURE — 80048 BASIC METABOLIC PNL TOTAL CA: CPT

## 2023-11-27 ENCOUNTER — TELEPHONE (OUTPATIENT)
Dept: CARDIOLOGY | Facility: CLINIC | Age: 83
End: 2023-11-27
Payer: COMMERCIAL

## 2023-12-04 ENCOUNTER — OFFICE VISIT (OUTPATIENT)
Dept: CARDIOLOGY | Facility: CLINIC | Age: 83
End: 2023-12-04
Payer: COMMERCIAL

## 2023-12-04 ENCOUNTER — LAB (OUTPATIENT)
Dept: LAB | Facility: LAB | Age: 83
End: 2023-12-04
Payer: COMMERCIAL

## 2023-12-04 DIAGNOSIS — I48.91 ATRIAL FIBRILLATION, UNSPECIFIED TYPE (MULTI): ICD-10-CM

## 2023-12-04 DIAGNOSIS — I48.91 ATRIAL FIBRILLATION, UNSPECIFIED TYPE (MULTI): Primary | ICD-10-CM

## 2023-12-04 PROCEDURE — 1159F MED LIST DOCD IN RCRD: CPT | Performed by: NURSE PRACTITIONER

## 2023-12-04 PROCEDURE — 83735 ASSAY OF MAGNESIUM: CPT

## 2023-12-04 PROCEDURE — 93005 ELECTROCARDIOGRAM TRACING: CPT | Performed by: NURSE PRACTITIONER

## 2023-12-04 PROCEDURE — 1160F RVW MEDS BY RX/DR IN RCRD: CPT | Performed by: NURSE PRACTITIONER

## 2023-12-04 PROCEDURE — 1036F TOBACCO NON-USER: CPT | Performed by: NURSE PRACTITIONER

## 2023-12-04 PROCEDURE — 99214 OFFICE O/P EST MOD 30 MIN: CPT | Performed by: NURSE PRACTITIONER

## 2023-12-04 PROCEDURE — 1126F AMNT PAIN NOTED NONE PRSNT: CPT | Performed by: NURSE PRACTITIONER

## 2023-12-04 PROCEDURE — 93010 ELECTROCARDIOGRAM REPORT: CPT | Performed by: INTERNAL MEDICINE

## 2023-12-04 PROCEDURE — 36415 COLL VENOUS BLD VENIPUNCTURE: CPT

## 2023-12-04 PROCEDURE — 80048 BASIC METABOLIC PNL TOTAL CA: CPT

## 2023-12-04 ASSESSMENT — ENCOUNTER SYMPTOMS
PALPITATIONS: 1
SHORTNESS OF BREATH: 1
DYSPNEA ON EXERTION: 1

## 2023-12-04 NOTE — PROGRESS NOTES
"Subjective   [place] KIMBERLYN Ozuna is a 83 y.o. female.    Chief Complaint:  Shortness of breath    HPI  Mrs. Ozuna is seen for a follow up visit.  She is accompanied by her daughter. We have been trying to get her euvolemic in preparation for cardioversion as she has reverted back to Afib.  She presents today feeling improved having lost about 13 pounds over the past few weeks.  She continues to have some shortness of breath.  She has palpitations as well.  Creatinine is slightly elevated. She had some chest tightness on Saturday accompanied by \"gasping\" for air.  She has no new symptoms.  She has been compliant with all medication and anticoagulation therapy.     Review of Systems   Cardiovascular:  Positive for dyspnea on exertion and palpitations.   Respiratory:  Positive for shortness of breath.    All other systems reviewed and are negative.      Objective   Vitals reviewed.   Constitutional:       Appearance: Healthy appearance. Not in distress.   Eyes:      Pupils: Pupils are equal, round, and reactive to light.   Neck:      Vascular: No JVR. JVD elevated.   Pulmonary:      Effort: Pulmonary effort is normal.      Breath sounds: Normal breath sounds. No wheezing. No rhonchi. No rales.   Chest:      Chest wall: Not tender to palpatation.   Cardiovascular:      Normal rate. Regular rhythm. Normal S1. Normal S2.       Murmurs: There is no murmur.      No gallop.  No click. No rub.   Edema:     Peripheral edema absent.   Abdominal:      Tenderness: There is no abdominal tenderness.   Musculoskeletal: Normal range of motion.         General: No tenderness.      Cervical back: Normal range of motion. Skin:     General: Skin is warm and dry.   Neurological:      General: No focal deficit present.      Mental Status: Alert and oriented to person, place and time.   Psychiatric:         Mood and Affect: Mood normal.         Behavior: Behavior is cooperative.         Lab Review:   Lab Results   Component Value Date     " 11/20/2023    K 5.0 11/20/2023     11/20/2023    CO2 34 (H) 11/20/2023    BUN 26 (H) 11/20/2023    CREATININE 1.41 (H) 11/20/2023    GLUCOSE 108 (H) 11/20/2023    CALCIUM 9.5 11/20/2023     Lab Results   Component Value Date    WBC 6.4 11/01/2023    HGB 12.0 11/01/2023    HCT 39.6 11/01/2023    MCV 94 11/01/2023     11/01/2023     Lab Results   Component Value Date    CHOL 130 11/01/2023    TRIG 77 11/01/2023    HDL 59.1 11/01/2023     ECG obtained and reviewed shows atrial fibrillation with a rate of 63 bpm and occasional V paced beats    Assessment/Plan   In summary, Mrs Ozuna is a pleasant 83 year old female with a medical history significant for AVNRT ablation in 2006, atrial fibrillation with multiple DCCV, permanent pacemaker placement 2018, mild to moderate MR and moderate PTHN by echo 9/2023 with preserved LV and RV function, hypertension and hyperlipidemia. Nuclear stress test 5/2016 showed no ischemia. She has been compliant with both amiodarone and Eliquis anticoagulation however continues in atrial fibrillation.  She has lost about 13 pounds and is symptomatically improved.  Exam is improved as well.  VS are stable.  ECG confirms AF. She will be sent for labs today and we will plan on DCCV on Wednesday of this week at [facility].  After that time we will discuss with EP (Dr. Valenzuela) continued therapy with Amiodarone as she has been on it for over 3 years and has breakthrough AF.  Further recommendation will be made following her procedure. For now all medication will remain unchanged.  She and her daughter know to call for any questions or concerns.

## 2023-12-05 LAB
ANION GAP SERPL CALC-SCNC: 15 MMOL/L (ref 10–20)
ATRIAL RATE: 416 BPM
BUN SERPL-MCNC: 28 MG/DL (ref 6–23)
CALCIUM SERPL-MCNC: 9.8 MG/DL (ref 8.6–10.6)
CHLORIDE SERPL-SCNC: 104 MMOL/L (ref 98–107)
CO2 SERPL-SCNC: 30 MMOL/L (ref 21–32)
CREAT SERPL-MCNC: 1.29 MG/DL (ref 0.5–1.05)
GFR SERPL CREATININE-BSD FRML MDRD: 41 ML/MIN/1.73M*2
GLUCOSE SERPL-MCNC: 115 MG/DL (ref 74–99)
MAGNESIUM SERPL-MCNC: 2.86 MG/DL (ref 1.6–2.4)
POTASSIUM SERPL-SCNC: 4.9 MMOL/L (ref 3.5–5.3)
Q ONSET: 210 MS
QRS COUNT: 11 BEATS
QRS DURATION: 106 MS
QT INTERVAL: 414 MS
QTC CALCULATION(BAZETT): 423 MS
QTC FREDERICIA: 420 MS
R AXIS: -2 DEGREES
SODIUM SERPL-SCNC: 144 MMOL/L (ref 136–145)
T AXIS: 18 DEGREES
T OFFSET: 417 MS
VENTRICULAR RATE: 63 BPM

## 2023-12-05 RX ORDER — SODIUM CHLORIDE 9 MG/ML
100 INJECTION, SOLUTION INTRAVENOUS CONTINUOUS
Status: CANCELLED | OUTPATIENT
Start: 2023-12-05

## 2023-12-06 ENCOUNTER — HOSPITAL ENCOUNTER (OUTPATIENT)
Dept: CARDIOLOGY | Facility: HOSPITAL | Age: 83
Discharge: HOME | End: 2023-12-06
Payer: COMMERCIAL

## 2023-12-06 ENCOUNTER — HOSPITAL ENCOUNTER (OUTPATIENT)
Dept: CARDIOLOGY | Facility: HOSPITAL | Age: 83
End: 2023-12-06
Payer: COMMERCIAL

## 2023-12-06 DIAGNOSIS — I48.91 A-FIB (MULTI): ICD-10-CM

## 2023-12-06 DIAGNOSIS — I48.91 ATRIAL FIBRILLATION (MULTI): Primary | ICD-10-CM

## 2023-12-06 PROCEDURE — 93286 PERI-PX EVAL PM/LDLS PM IP: CPT | Performed by: INTERNAL MEDICINE

## 2023-12-06 PROCEDURE — 93286 PERI-PX EVAL PM/LDLS PM IP: CPT

## 2023-12-06 RX ORDER — ONDANSETRON HYDROCHLORIDE 2 MG/ML
4 INJECTION, SOLUTION INTRAVENOUS ONCE AS NEEDED
Status: DISCONTINUED | OUTPATIENT
Start: 2023-12-06 | End: 2023-12-07 | Stop reason: HOSPADM

## 2023-12-06 RX ORDER — SODIUM CHLORIDE, SODIUM LACTATE, POTASSIUM CHLORIDE, CALCIUM CHLORIDE 600; 310; 30; 20 MG/100ML; MG/100ML; MG/100ML; MG/100ML
100 INJECTION, SOLUTION INTRAVENOUS CONTINUOUS
Status: DISCONTINUED | OUTPATIENT
Start: 2023-12-06 | End: 2023-12-07 | Stop reason: HOSPADM

## 2023-12-06 RX ORDER — LIDOCAINE HYDROCHLORIDE 10 MG/ML
0.1 INJECTION INFILTRATION; PERINEURAL ONCE
Status: DISCONTINUED | OUTPATIENT
Start: 2023-12-06 | End: 2023-12-07 | Stop reason: HOSPADM

## 2023-12-06 NOTE — NURSING NOTE
Patient with PPM, interrogated pacer which shows A-paced, underlying SR. Dr Johnson notified. Education given to patient on underlying rhythm and not applicable for cvn. Patient getting dressed, will wait for Elizabeth to come in and talk with her.

## 2023-12-07 DIAGNOSIS — E78.00 HYPERCHOLESTEREMIA: Primary | ICD-10-CM

## 2023-12-07 RX ORDER — ATORVASTATIN CALCIUM 20 MG/1
20 TABLET, FILM COATED ORAL DAILY
Qty: 90 TABLET | Refills: 3 | Status: SHIPPED | OUTPATIENT
Start: 2023-12-07 | End: 2024-12-06

## 2023-12-17 DIAGNOSIS — I49.5 SICK SINUS SYNDROME (MULTI): ICD-10-CM

## 2023-12-18 ENCOUNTER — OFFICE VISIT (OUTPATIENT)
Dept: CARDIOLOGY | Facility: CLINIC | Age: 83
End: 2023-12-18
Payer: COMMERCIAL

## 2023-12-18 VITALS
HEIGHT: 63 IN | HEART RATE: 60 BPM | DIASTOLIC BLOOD PRESSURE: 80 MMHG | WEIGHT: 188.4 LBS | BODY MASS INDEX: 33.38 KG/M2 | SYSTOLIC BLOOD PRESSURE: 135 MMHG

## 2023-12-18 DIAGNOSIS — I48.91 ATRIAL FIBRILLATION, UNSPECIFIED TYPE (MULTI): Primary | ICD-10-CM

## 2023-12-18 LAB
ATRIAL RATE: 64 BPM
PR INTERVAL: 338 MS
Q ONSET: 225 MS
QRS COUNT: 10 BEATS
QRS DURATION: 94 MS
QT INTERVAL: 416 MS
QTC CALCULATION(BAZETT): 416 MS
QTC FREDERICIA: 416 MS
R AXIS: -30 DEGREES
T AXIS: 21 DEGREES
T OFFSET: 433 MS
VENTRICULAR RATE: 60 BPM

## 2023-12-18 PROCEDURE — 1160F RVW MEDS BY RX/DR IN RCRD: CPT | Performed by: INTERNAL MEDICINE

## 2023-12-18 PROCEDURE — 99214 OFFICE O/P EST MOD 30 MIN: CPT | Mod: 25 | Performed by: INTERNAL MEDICINE

## 2023-12-18 PROCEDURE — 1159F MED LIST DOCD IN RCRD: CPT | Performed by: INTERNAL MEDICINE

## 2023-12-18 PROCEDURE — 99214 OFFICE O/P EST MOD 30 MIN: CPT | Performed by: INTERNAL MEDICINE

## 2023-12-18 PROCEDURE — 93010 ELECTROCARDIOGRAM REPORT: CPT | Performed by: INTERNAL MEDICINE

## 2023-12-18 PROCEDURE — 1036F TOBACCO NON-USER: CPT | Performed by: INTERNAL MEDICINE

## 2023-12-18 PROCEDURE — 3079F DIAST BP 80-89 MM HG: CPT | Performed by: INTERNAL MEDICINE

## 2023-12-18 PROCEDURE — 1126F AMNT PAIN NOTED NONE PRSNT: CPT | Performed by: INTERNAL MEDICINE

## 2023-12-18 PROCEDURE — 3075F SYST BP GE 130 - 139MM HG: CPT | Performed by: INTERNAL MEDICINE

## 2023-12-18 PROCEDURE — 93005 ELECTROCARDIOGRAM TRACING: CPT | Performed by: INTERNAL MEDICINE

## 2023-12-18 RX ORDER — POTASSIUM CHLORIDE 20 MEQ/1
TABLET, EXTENDED RELEASE ORAL
Qty: 90 TABLET | Refills: 1 | Status: SHIPPED | OUTPATIENT
Start: 2023-12-18

## 2023-12-18 ASSESSMENT — ENCOUNTER SYMPTOMS
OCCASIONAL FEELINGS OF UNSTEADINESS: 0
DEPRESSION: 0
LOSS OF SENSATION IN FEET: 0

## 2023-12-18 ASSESSMENT — PATIENT HEALTH QUESTIONNAIRE - PHQ9
SUM OF ALL RESPONSES TO PHQ9 QUESTIONS 1 AND 2: 0
2. FEELING DOWN, DEPRESSED OR HOPELESS: NOT AT ALL
1. LITTLE INTEREST OR PLEASURE IN DOING THINGS: NOT AT ALL

## 2023-12-18 ASSESSMENT — PAIN SCALES - GENERAL: PAINLEVEL: 0-NO PAIN

## 2023-12-18 NOTE — PROGRESS NOTES
Subjective:  Patient returns for a follow-up appointment.  She was recently scheduled for a cardioversion due to some recurrent atrial fibrillation as well as significant volume overload.  Following diuresis, she appeared to spontaneously revert to NSR so she did not need an electrical cardioversion.  She comes back today for follow-up.  She generally feels she is doing a bit better at this time.  Her weight is going down slowly and her weight today is around 188.  She feels a better weight for her would probably be closer to 180.  Her peripheral edema has largely resolved, and her dyspnea has improved significantly.  She denies any bleeding problems despite good compliance with her Eliquis.  She overall is generally comfortable with her improvement.    Objective:  General: Alert, and unchanged.  HEENT: Unchanged.  Lungs: Clear with good air exchange.  Cardiac: Normal S1 and S2 with faint systolic murmur.  Abdomen: Nontender with normal bowel sounds and no bruits.  Extremities: No edema.  Skin: No rash.  Neuro: Grossly intact.    EKG: Atrial paced rhythm with prolonged AV conduction.  Narrow complex QRS complex.    Impression/plan:  Laurie is doing somewhat better at this time.  I will increase her Demadex to 40 mg for the next 2 days, and will then reduce her back to 30 mg.  I would like to slowly get her weight down closer to 180.  I will have her call us back in 9 days and sort out whether we need to further escalate her diuretic dosing.  I elected to continue her other medicines unchanged and will not embark on any other cardiovascular testing at this time.  I will plan on seeing her back for routine follow-up in 6 weeks.  She did not need any prescriptions renewed.    Patient instructions:    Increase Demadex to 40 mg for the next 2 days then return to 30 mg daily.    Continue other medications unchanged.    Call with an update on your weights in 9 days.    Return to clinic in 6 weeks.

## 2024-01-23 ENCOUNTER — HOSPITAL ENCOUNTER (OUTPATIENT)
Dept: CARDIOLOGY | Facility: CLINIC | Age: 84
Discharge: HOME | End: 2024-01-23
Payer: COMMERCIAL

## 2024-01-23 DIAGNOSIS — Z95.0 PRESENCE OF CARDIAC PACEMAKER: ICD-10-CM

## 2024-01-23 DIAGNOSIS — I49.5 SICK SINUS SYNDROME (MULTI): ICD-10-CM

## 2024-02-05 ENCOUNTER — OFFICE VISIT (OUTPATIENT)
Dept: CARDIOLOGY | Facility: CLINIC | Age: 84
End: 2024-02-05
Payer: MEDICARE

## 2024-02-05 ENCOUNTER — HOSPITAL ENCOUNTER (OUTPATIENT)
Dept: CARDIOLOGY | Facility: CLINIC | Age: 84
Discharge: HOME | End: 2024-02-05
Payer: MEDICARE

## 2024-02-05 VITALS
SYSTOLIC BLOOD PRESSURE: 145 MMHG | WEIGHT: 192.7 LBS | OXYGEN SATURATION: 98 % | HEIGHT: 63 IN | DIASTOLIC BLOOD PRESSURE: 75 MMHG | BODY MASS INDEX: 34.14 KG/M2 | HEART RATE: 60 BPM

## 2024-02-05 DIAGNOSIS — I48.0 PAROXYSMAL ATRIAL FIBRILLATION (MULTI): Primary | ICD-10-CM

## 2024-02-05 DIAGNOSIS — I48.91 ATRIAL FIBRILLATION, UNSPECIFIED TYPE (MULTI): Primary | ICD-10-CM

## 2024-02-05 PROCEDURE — 1125F AMNT PAIN NOTED PAIN PRSNT: CPT | Performed by: INTERNAL MEDICINE

## 2024-02-05 PROCEDURE — 3075F SYST BP GE 130 - 139MM HG: CPT | Performed by: INTERNAL MEDICINE

## 2024-02-05 PROCEDURE — 1036F TOBACCO NON-USER: CPT | Performed by: INTERNAL MEDICINE

## 2024-02-05 PROCEDURE — 1160F RVW MEDS BY RX/DR IN RCRD: CPT | Performed by: INTERNAL MEDICINE

## 2024-02-05 PROCEDURE — 99214 OFFICE O/P EST MOD 30 MIN: CPT | Mod: 25 | Performed by: INTERNAL MEDICINE

## 2024-02-05 PROCEDURE — 93010 ELECTROCARDIOGRAM REPORT: CPT | Performed by: INTERNAL MEDICINE

## 2024-02-05 PROCEDURE — 1159F MED LIST DOCD IN RCRD: CPT | Performed by: INTERNAL MEDICINE

## 2024-02-05 PROCEDURE — 3078F DIAST BP <80 MM HG: CPT | Performed by: INTERNAL MEDICINE

## 2024-02-05 PROCEDURE — 99214 OFFICE O/P EST MOD 30 MIN: CPT | Performed by: INTERNAL MEDICINE

## 2024-02-05 PROCEDURE — 93005 ELECTROCARDIOGRAM TRACING: CPT

## 2024-02-05 RX ORDER — METOPROLOL SUCCINATE 25 MG/1
25 TABLET, EXTENDED RELEASE ORAL DAILY
Qty: 90 TABLET | Refills: 3 | Status: SHIPPED | OUTPATIENT
Start: 2024-02-05 | End: 2024-04-15 | Stop reason: SDUPTHER

## 2024-02-05 ASSESSMENT — ENCOUNTER SYMPTOMS
LOSS OF SENSATION IN FEET: 0
DEPRESSION: 0
OCCASIONAL FEELINGS OF UNSTEADINESS: 0

## 2024-02-05 ASSESSMENT — PATIENT HEALTH QUESTIONNAIRE - PHQ9
2. FEELING DOWN, DEPRESSED OR HOPELESS: NOT AT ALL
1. LITTLE INTEREST OR PLEASURE IN DOING THINGS: NOT AT ALL
SUM OF ALL RESPONSES TO PHQ9 QUESTIONS 1 AND 2: 0

## 2024-02-05 ASSESSMENT — PAIN SCALES - GENERAL: PAINLEVEL: 6

## 2024-02-05 NOTE — PROGRESS NOTES
Subjective:  Patient returns a for routine follow-up.  I was pleased to see that she has generally been clinically stable since her last visit.  Her weight is up a bit but she admits to eating more than she normally does due to some out-of-town visitors with whom she has been eating  out with  fairly frequently.  Her breathing is generally quite good and she has not had any problematic recurrent peripheral edema.  She overall is generally quite happy with how she is doing.  She denies any bleeding problems despite good compliance with her Eliquis.  She remains compliant with her amiodarone as well.  She overall is generally quite happy with how she is doing.  Her current weight appears to be around 192 by her scale.    Objective:  General: Alert, usual delightful self.  HEENT: Unchanged.  Lungs: Clear with good air exchange no crackles or wheezing.  Cardiac: Normal JVP.  Normal S1 and S2 with soft systolic murmur.  Abdomen: Nontender.  Extremities: No edema.  Skin: No rash.  Neuro: Grossly intact.    EKG: AV paced rhythm.    Impression/plan:  Laurie is doing reasonably well at this time.  She does appear to be maintaining normal sinus rhythm now that we have rendered her euvolemic.  She remains compliant with her amiodarone, metoprolol and Eliquis for her PAF.    I do think she is fairly close to euvolemic, so she will update me in 1 week to let me know where her weight stands.  Should her weight escalate again, I will plan on titrating up her torsemide to try to make sure we maintain her at a reasonable euvolemic state.    She did not need any prescriptions renewed.  Given her general clinical stability, I did not think we needed to embark on any other repeat cardiovascular testing at this time.  I will see her back for routine follow-up in 2 months.    Patient instructions:    Continue current medications unchanged.    Call with an update in 1 week with your daily weights and clinical status.    Return to clinic in  2 months.

## 2024-02-06 LAB
ATRIAL RATE: 34 BPM
PR INTERVAL: 292 MS
Q ONSET: 193 MS
QRS COUNT: 10 BEATS
QRS DURATION: 132 MS
QT INTERVAL: 468 MS
QTC CALCULATION(BAZETT): 468 MS
QTC FREDERICIA: 468 MS
R AXIS: -52 DEGREES
T AXIS: 44 DEGREES
T OFFSET: 427 MS
VENTRICULAR RATE: 60 BPM

## 2024-02-12 ENCOUNTER — TELEPHONE (OUTPATIENT)
Dept: CARDIOLOGY | Facility: CLINIC | Age: 84
End: 2024-02-12
Payer: COMMERCIAL

## 2024-02-21 NOTE — ADDENDUM NOTE
Encounter addended by: Deborah Mcdaniel RN on: 2/21/2024 4:03 PM   Actions taken: Procedure Event Log accessed

## 2024-03-03 DIAGNOSIS — I48.91 ATRIAL FIBRILLATION, UNSPECIFIED TYPE (MULTI): Primary | ICD-10-CM

## 2024-03-06 DIAGNOSIS — I47.19 PAROXYSMAL ATRIAL TACHYCARDIA (CMS-HCC): Primary | ICD-10-CM

## 2024-03-06 RX ORDER — APIXABAN 5 MG/1
5 TABLET, FILM COATED ORAL 2 TIMES DAILY
Qty: 180 TABLET | Refills: 3 | Status: SHIPPED | OUTPATIENT
Start: 2024-03-06 | End: 2025-03-06

## 2024-03-08 RX ORDER — APIXABAN 5 MG/1
5 TABLET, FILM COATED ORAL 2 TIMES DAILY
Qty: 180 TABLET | Refills: 3 | Status: SHIPPED | OUTPATIENT
Start: 2024-03-08

## 2024-04-08 ENCOUNTER — APPOINTMENT (OUTPATIENT)
Dept: CARDIOLOGY | Facility: CLINIC | Age: 84
End: 2024-04-08
Payer: MEDICARE

## 2024-04-15 ENCOUNTER — HOSPITAL ENCOUNTER (OUTPATIENT)
Dept: CARDIOLOGY | Facility: CLINIC | Age: 84
Discharge: HOME | End: 2024-04-15
Payer: COMMERCIAL

## 2024-04-15 ENCOUNTER — OFFICE VISIT (OUTPATIENT)
Dept: CARDIOLOGY | Facility: CLINIC | Age: 84
End: 2024-04-15
Payer: COMMERCIAL

## 2024-04-15 VITALS
BODY MASS INDEX: 35.01 KG/M2 | HEIGHT: 63 IN | SYSTOLIC BLOOD PRESSURE: 134 MMHG | OXYGEN SATURATION: 96 % | HEART RATE: 71 BPM | DIASTOLIC BLOOD PRESSURE: 70 MMHG | WEIGHT: 197.6 LBS

## 2024-04-15 DIAGNOSIS — Z95.0 PACEMAKER: ICD-10-CM

## 2024-04-15 DIAGNOSIS — I48.0 PAROXYSMAL ATRIAL FIBRILLATION (MULTI): ICD-10-CM

## 2024-04-15 DIAGNOSIS — I10 BENIGN ESSENTIAL HYPERTENSION: ICD-10-CM

## 2024-04-15 DIAGNOSIS — E78.00 HYPERCHOLESTEREMIA: ICD-10-CM

## 2024-04-15 DIAGNOSIS — I48.0 PAROXYSMAL ATRIAL FIBRILLATION (MULTI): Primary | ICD-10-CM

## 2024-04-15 DIAGNOSIS — I34.0 NONRHEUMATIC MITRAL VALVE REGURGITATION: ICD-10-CM

## 2024-04-15 PROCEDURE — 99214 OFFICE O/P EST MOD 30 MIN: CPT | Performed by: NURSE PRACTITIONER

## 2024-04-15 PROCEDURE — 3075F SYST BP GE 130 - 139MM HG: CPT | Performed by: NURSE PRACTITIONER

## 2024-04-15 PROCEDURE — 1126F AMNT PAIN NOTED NONE PRSNT: CPT | Performed by: NURSE PRACTITIONER

## 2024-04-15 PROCEDURE — 1036F TOBACCO NON-USER: CPT | Performed by: NURSE PRACTITIONER

## 2024-04-15 PROCEDURE — 1160F RVW MEDS BY RX/DR IN RCRD: CPT | Performed by: NURSE PRACTITIONER

## 2024-04-15 PROCEDURE — 1159F MED LIST DOCD IN RCRD: CPT | Performed by: NURSE PRACTITIONER

## 2024-04-15 PROCEDURE — 3078F DIAST BP <80 MM HG: CPT | Performed by: NURSE PRACTITIONER

## 2024-04-15 PROCEDURE — 93005 ELECTROCARDIOGRAM TRACING: CPT

## 2024-04-15 PROCEDURE — 93010 ELECTROCARDIOGRAM REPORT: CPT | Performed by: INTERNAL MEDICINE

## 2024-04-15 RX ORDER — TORSEMIDE 20 MG/1
30 TABLET ORAL DAILY
Qty: 90 TABLET | Refills: 3 | Status: SHIPPED | OUTPATIENT
Start: 2024-04-15

## 2024-04-15 RX ORDER — METOPROLOL SUCCINATE 25 MG/1
25 TABLET, EXTENDED RELEASE ORAL DAILY
Qty: 90 TABLET | Refills: 3 | Status: SHIPPED | OUTPATIENT
Start: 2024-04-15

## 2024-04-15 ASSESSMENT — ENCOUNTER SYMPTOMS
RESPIRATORY NEGATIVE: 1
PALPITATIONS: 1

## 2024-04-15 ASSESSMENT — PAIN SCALES - GENERAL: PAINLEVEL: 0-NO PAIN

## 2024-04-15 ASSESSMENT — COLUMBIA-SUICIDE SEVERITY RATING SCALE - C-SSRS
6. HAVE YOU EVER DONE ANYTHING, STARTED TO DO ANYTHING, OR PREPARED TO DO ANYTHING TO END YOUR LIFE?: NO
2. HAVE YOU ACTUALLY HAD ANY THOUGHTS OF KILLING YOURSELF?: NO
1. IN THE PAST MONTH, HAVE YOU WISHED YOU WERE DEAD OR WISHED YOU COULD GO TO SLEEP AND NOT WAKE UP?: NO

## 2024-04-15 NOTE — PATIENT INSTRUCTIONS
MAKE APPOINTMENT WITH DR. ELIZONDO    CONTINUE ALL MEDICATION    CALL FOR ANY CONCERNS    RETURN IN 2 MONTHS

## 2024-04-15 NOTE — PROGRESS NOTES
Subjective   Laurie KIMBERLYN Ozuna is a 83 y.o. female.    Chief Complaint:  Follow-up    HPI  Mrs. Ozuna is seen for a 2-month follow-up.  She has no particular cardiovascular concerns.  She does have an occasional palpitation but nothing sustained.  She had a recent device interrogation showing 2.3% AF burden.  She remains on Eliquis anticoagulation.  She is compliant with all of her medication.  She is wondering how long she has to take amiodarone.  She has been eating more calories lately and is not as active given her back pain.  She has not been walking as much as she was.  She has gained about 4 pounds.  She does not feel that she is gaining water weight but that she is gaining weight due to overeating.  She denies any new shortness of breath or edema.  The patient denies chest pain, lightheadedness, syncope, orthopnea, paroxysmal nocturnal dyspnea, or bleeding problems.  All other systems have been reviewed and are negative for complaint.    Review of Systems   Cardiovascular:  Positive for palpitations.   Respiratory: Negative.         Objective        Appearance: Healthy appearance. Not in distress.   Eyes:      Pupils: Pupils are equal, round, and reactive to light.   Neck:      Vascular: No JVR.  No JVD  Pulmonary:      Effort: Pulmonary effort is normal.      Breath sounds: Normal breath sounds. No wheezing. No rhonchi. No rales.   Chest:      Chest wall: Not tender to palpitation.   Cardiovascular:      Normal rate. Regular rhythm. Normal S1. Normal S2.       Murmurs: There is no murmur.      No gallop.  No click. No rub.   Edema:     Trace ankle peripheral edema   Abdominal:      Tenderness: There is no abdominal tenderness.   Musculoskeletal: Normal range of motion.         General: No tenderness.      Cervical back: Normal range of motion.   Skin:     General: Skin is warm and dry.   Neurological:      General: No focal deficit present.      Mental Status: Alert and oriented to person, place and time.    Psychiatric:         Mood and Affect: Mood normal.         Behavior: Behavior is cooperative.     Lab Review:   Lab Results   Component Value Date     12/04/2023    K 4.9 12/04/2023     12/04/2023    CO2 30 12/04/2023    BUN 28 (H) 12/04/2023    CREATININE 1.29 (H) 12/04/2023    GLUCOSE 115 (H) 12/04/2023    CALCIUM 9.8 12/04/2023     Lab Results   Component Value Date    WBC 6.4 11/01/2023    HGB 12.0 11/01/2023    HCT 39.6 11/01/2023    MCV 94 11/01/2023     11/01/2023     Lab Results   Component Value Date    CHOL 130 11/01/2023    TRIG 77 11/01/2023    HDL 59.1 11/01/2023     ECG obtained and reviewed, shows a paced rhythm with ventricular rate of 62 bpm      Assessment/Plan   Mrs Ozuna is a pleasant 83 year old female with a medical history significant for AVNRT ablation in 2006, atrial fibrillation with multiple DCCV on Amiodarone since 2018, permanent pacemaker placement 2018, mild to moderate MR and moderate PTHN by echo 10/2023 with preserved LV and RV function, hypertension and hyperlipidemia. Nuclear stress test 5/2016 showed no ischemia.  She presents rather stable from a cardiovascular standpoint with very brief intermittent palpitation.  Recent device check shows 2.3% AF burden.  She is on Eliquis anticoagulation and compliant.  She has had a small weight gain which she attributes to overeating.  Vital signs and ECG are stable.  She is euvolemic on exam except for trace ankle edema.  I have asked that she take an additional half tablet of torsemide for the next 2 days.  I also encouraged her to elevate her feet, limit her sodium and limit calories.  She has been on amiodarone for about 6 years.  Liver function tests, thyroid function tests are unremarkable.  She has not had recent PFTs.  She is questioning the duration of amiodarone therapy and I will refer her to Dr. Valenzuela.  I would like her to continue medication unchanged for now.  She will follow-up with us in 2 months.  We  will consider repeat echo as she is now back in sinus rhythm.  She knows to continue daily weights and to call for any interim concerns or questions.

## 2024-04-16 LAB
ATRIAL RATE: 57 BPM
PR INTERVAL: 174 MS
Q ONSET: 209 MS
QRS COUNT: 10 BEATS
QRS DURATION: 96 MS
QT INTERVAL: 432 MS
QTC CALCULATION(BAZETT): 438 MS
QTC FREDERICIA: 437 MS
R AXIS: -16 DEGREES
T AXIS: 33 DEGREES
T OFFSET: 425 MS
VENTRICULAR RATE: 62 BPM

## 2024-04-17 ENCOUNTER — OFFICE VISIT (OUTPATIENT)
Dept: CARDIOLOGY | Facility: CLINIC | Age: 84
End: 2024-04-17
Payer: COMMERCIAL

## 2024-04-17 ENCOUNTER — HOSPITAL ENCOUNTER (OUTPATIENT)
Dept: CARDIOLOGY | Facility: CLINIC | Age: 84
Discharge: HOME | End: 2024-04-17
Payer: COMMERCIAL

## 2024-04-17 VITALS
RESPIRATION RATE: 18 BRPM | WEIGHT: 197 LBS | HEIGHT: 63 IN | DIASTOLIC BLOOD PRESSURE: 71 MMHG | HEART RATE: 60 BPM | OXYGEN SATURATION: 95 % | BODY MASS INDEX: 34.91 KG/M2 | SYSTOLIC BLOOD PRESSURE: 144 MMHG

## 2024-04-17 DIAGNOSIS — I48.0 PAROXYSMAL ATRIAL FIBRILLATION (MULTI): ICD-10-CM

## 2024-04-17 DIAGNOSIS — I42.8 NONISCHEMIC CARDIOMYOPATHY (MULTI): ICD-10-CM

## 2024-04-17 PROCEDURE — 1160F RVW MEDS BY RX/DR IN RCRD: CPT | Performed by: INTERNAL MEDICINE

## 2024-04-17 PROCEDURE — 1126F AMNT PAIN NOTED NONE PRSNT: CPT | Performed by: INTERNAL MEDICINE

## 2024-04-17 PROCEDURE — 99214 OFFICE O/P EST MOD 30 MIN: CPT | Performed by: INTERNAL MEDICINE

## 2024-04-17 PROCEDURE — 93280 PM DEVICE PROGR EVAL DUAL: CPT

## 2024-04-17 PROCEDURE — 93280 PM DEVICE PROGR EVAL DUAL: CPT | Performed by: INTERNAL MEDICINE

## 2024-04-17 PROCEDURE — 3077F SYST BP >= 140 MM HG: CPT | Performed by: INTERNAL MEDICINE

## 2024-04-17 PROCEDURE — 1159F MED LIST DOCD IN RCRD: CPT | Performed by: INTERNAL MEDICINE

## 2024-04-17 PROCEDURE — 3078F DIAST BP <80 MM HG: CPT | Performed by: INTERNAL MEDICINE

## 2024-04-17 PROCEDURE — 99214 OFFICE O/P EST MOD 30 MIN: CPT | Mod: 25 | Performed by: INTERNAL MEDICINE

## 2024-04-17 ASSESSMENT — COLUMBIA-SUICIDE SEVERITY RATING SCALE - C-SSRS
1. IN THE PAST MONTH, HAVE YOU WISHED YOU WERE DEAD OR WISHED YOU COULD GO TO SLEEP AND NOT WAKE UP?: NO
6. HAVE YOU EVER DONE ANYTHING, STARTED TO DO ANYTHING, OR PREPARED TO DO ANYTHING TO END YOUR LIFE?: NO
2. HAVE YOU ACTUALLY HAD ANY THOUGHTS OF KILLING YOURSELF?: NO

## 2024-04-17 ASSESSMENT — CHA2DS2 SCORE
VASCULAR DISEASE: YES
DIABETES: NO
CHF OR LEFT VENTRICULAR DYSFUNCTION: NO
SEX: FEMALE
AGE IN YEARS: 75+
CHA2D2S VASC SCORE: 5
HYPERTENSION: YES
PRIOR STROKE OR TIA OR THROMBOEMBOLISM: NO

## 2024-04-17 ASSESSMENT — ENCOUNTER SYMPTOMS
LOSS OF SENSATION IN FEET: 0
DEPRESSION: 0
OCCASIONAL FEELINGS OF UNSTEADINESS: 0

## 2024-04-17 ASSESSMENT — PAIN SCALES - GENERAL: PAINLEVEL: 0-NO PAIN

## 2024-04-17 NOTE — PROGRESS NOTES
Returns for follow up visit for    Chief Complaint   Patient presents with    Atrial Fibrillation    Device Check     Patient presents to clinic at the suggestion of Dr. Johnson.  Patient complains of intermittent palpitations reported as brief fast heart beats. She also appreciates shortness of breath with activity (walking) and occasional lightheadedness. Patient denies syncope, orthopnea and chest pain/discomfort.  YOAV Felder RN         History Of Present Illness:    Laurie Ozuna is a 83 y.o. year old female patient presents after 2 years for follow up. She thinks that she is more short of breath with exertion.  She still has some tremor but it hsa not changed and is not worse.     82 yo with history of AVNRT ablation in 2006. She was initially treated with metoprolol but had profound bradycardia. Since then she did well but always had some shorter palpitations recently this has increased in frequency. She underwent recent ziopatch and echo. The monitor showed short episodes of atrial tachycardia from  bpm. She had only short offset pauses.   On the metoprolol 12.5 mg she is experiencing lightheadedness and near syncope so she stopped taking this on Monday. she presents for discussion about next steps. She underwent pacemaker implant Feb 12, 2018. It was successful. She was then initiated on metoprolol .  She was admitted to [facility] following a syncope and did have vertigo the following day. She fractured her tailbone but no head injury. CT negative.   She does have paroxysmal atrial fibrillation but these events are usually shortlived and rare. She has had more AF in January which correlated to her COVID infection     In the last year she has been doing some more exercises.But sheis having back pain so has cut her walking down. She is not having any AF episodes.  She is complaining of hair loss - but she has been switched to toprol to see if this is better.   She is also noting some hand tremor at  rest and with movement.   This last year she has lost many family members - , brother in law, and brother.     She feels well overall. She has more energy. She correlates this to a correct thyroid functions.       Past Medical History:  Past Medical History:   Diagnosis Date    Other conditions influencing health status     DEXA Body Composition Study    Other long term (current) drug therapy 07/07/2020    Chronic prescription benzodiazepine use    Personal history of other specified conditions 03/15/2018    History of diarrhea    Personal history of other specified conditions 10/26/2017    History of palpitations    Personal history of other specified conditions 10/21/2015    History of fatigue       Past Surgical History:  Past Surgical History:   Procedure Laterality Date    APPENDECTOMY  01/05/2015    Appendectomy    CHOLECYSTECTOMY  01/05/2015    Cholecystectomy    COLONOSCOPY  07/03/2013    Complete Colonoscopy    OTHER SURGICAL HISTORY  07/25/2018    Lithotomy          Social History:  Caffeine:   Cigarettes:  ETOH:  Drugs:  Exercise:  Occ:  Marital status:     Family History:  Family History   Problem Relation Name Age of Onset    Other (typhus) Mother      Coronary artery disease Father      Hyperthyroidism Sister      Coronary artery disease Brother          Allergies:  Allergies   Allergen Reactions    Lisinopril Cough        Outpatient Medications:  Current Outpatient Medications   Medication Instructions    acetaminophen (Tylenol) 500 mg tablet 2 tablets, oral, Daily    amiodarone (PACERONE) 200 mg, oral, Daily    atorvastatin (LIPITOR) 20 mg, oral, Daily    bisacodyl (DULCOLAX (BISACODYL)) 10 mg, rectal, Daily    calcium carbonate 600 mg calcium (1,500 mg) tablet 1 tablet, oral, 2 times daily    candesartan (ATACAND) 32 mg, oral, Daily    cyanocobalamin (vitamin B-12) 5,000 mcg, sublingual, Daily RT    docusate sodium (Colace) 100 mg capsule 1 capsule, oral, 2 times daily PRN    Eliquis 5 mg,  "oral, 2 times daily    Eliquis 5 mg, oral, 2 times daily    estradiol (ESTRACE) 2 g, vaginal, Daily, Apply a pea-sized amount to vaginal opening every Monday, Wednesday, and Friday evening    levothyroxine (SYNTHROID, LEVOXYL) 75 mcg, oral, Daily before breakfast    lidocaine (Lidoderm) 5 % patch 1 patch, transdermal, Daily, Remove & discard patch within 12 hours or as directed by MD.    melatonin 10 mg tablet 2 tablets, oral, Nightly    metoprolol succinate XL (TOPROL-XL) 25 mg, oral, Daily    multivit-iron-minerals-folic acid (Centrum Silver) 0.4 mg-300 mcg- 250 mcg tab 1 tablet, oral, Daily    potassium chloride CR 20 mEq ER tablet TAKE 1 TABLET(20 MEQ) BY MOUTH ONCE DAILY. DO NOT CRUSH OR CHEW    torsemide (DEMADEX) 30 mg, oral, Daily         Last Recorded Vitals:      12/18/2023    11:27 AM 12/18/2023    12:14 PM 2/5/2024     3:08 PM 2/5/2024     3:27 PM 4/15/2024     1:51 PM 4/15/2024     2:48 PM 4/17/2024     3:58 PM   Vitals   Systolic  135 130 145 153 134 144   Diastolic  80 70 75 75 70 71   Heart Rate 60 60 77 60 71  60   Resp       18   Height (in) 1.6 m (5' 3\")  1.6 m (5' 3\")  1.6 m (5' 3\")  1.6 m (5' 3\")   Weight (lb) 188.4  192.7  197.6  197   BMI 33.37 kg/m2  34.14 kg/m2  35 kg/m2  34.9 kg/m2   BSA (m2) 1.95 m2  1.97 m2  2 m2  1.99 m2   Visit Report Report Report Report Report Report Report Report        Physical Exam:  Physical Exam  Constitutional:       Appearance: Normal appearance.   HENT:      Head: Normocephalic.      Nose: Nose normal.   Neck:      Vascular: No carotid bruit.   Cardiovascular:      Rate and Rhythm: Normal rate. Rhythm irregular.      Heart sounds: Normal heart sounds. No murmur heard.     No friction rub. No gallop.   Pulmonary:      Breath sounds: Normal breath sounds. No wheezing, rhonchi or rales.   Musculoskeletal:         General: Normal range of motion.      Right lower leg: No edema.      Left lower leg: No edema.   Skin:     General: Skin is warm and dry. "   Neurological:      General: No focal deficit present.      Mental Status: She is alert and oriented to person, place, and time.   Psychiatric:         Mood and Affect: Mood normal.         Behavior: Behavior normal.          Last Cardiology Tests:  ECG:  Encounter Date: 04/15/24   ECG 12 Lead   Result Value    Ventricular Rate 62    Atrial Rate 57    FL Interval 174    QRS Duration 96    QT Interval 432    QTC Calculation(Bazett) 438    R Axis -16    T Axis 33    QRS Count 10    Q Onset 209    T Offset 425    QTC Fredericia 437    Narrative    Atrial-paced rhythm with frequent ventricular-paced complexes  Junctional ST depression, probably normal  Abnormal ECG  No previous ECGs available  Confirmed by Juan Johnson ([id number]) on 4/16/2024 2:02:46 PM        Echo:  Transthoracic Echo (TTE) Complete    Result Date: 10/21/2023   [facility], [address] [place] [address]              Tel [phone] and Fax [phone] TRANSTHORACIC ECHOCARDIOGRAM REPORT  Patient Name:     THUAN SOFIA FILIPPO       Reading Physician:   [id number] Romeo Gaines MD Study Date:       10/18/2023          Ordering Provider:   [id number] BECKY LAO MRN/PID:          [id number]            Fellow: Accession#:       [id number]        Nurse: Date of           1940 / 83      Sonographer:         Kirill Tarango RDCS Birth/Age:        years Gender:           F                   Additional Staff: Height:           160.02 cm           Admit Date: Weight:           83.91 kg            Admission Status:    Outpatient BSA:              1.87 m2             Encounter#:          [id number]                                       Department Location: [facility] Blood Pressure: 126 /74 mmHg Study Type:    TRANSTHORACIC ECHO (TTE) COMPLETE Diagnosis/ICD: Nonrheumatic mitral (valve) insufficiency-I34.0 Indication:    Mitral valve insufficiency CPT Code:      Echo Complete w Full  [id number] Patient History: Pertinent History: A-fib; chest pain; dyspnea; HTN; palpitations; dizziness;                    fatigue; SVT s/p AVNRT ablation. Study Detail: The following Echo studies were performed: 2D, M-Mode, Doppler and               color flow. Technically challenging study due to body habitus.  PHYSICIAN INTERPRETATION: Left Ventricle: The left ventricular systolic function is low normal, with an estimated ejection fraction of 55%. The calculated ejection fraction is normal at 57 % using the Samano's Bi-plane MOD calculation. There are no regional wall motion abnormalities. The left ventricular cavity size is upper limits of normal. Abnormal (paradoxical) septal motion, consistent with RV pacemaker. Spectral Doppler shows a normal pattern of left ventricular diastolic filling. Left Atrium: The left atrium is mild to moderately dilated. Right Ventricle: The right ventricle is normal in size. There is normal right ventriclar wall thickness. There is normal right ventricular global systolic function. A device is visualized in the right ventricle. Right Atrium: The right atrium is mild to moderately dilated. There is a device visualized in the right atrium. Aortic Valve: The aortic valve appears structurally normal. The aortic valve appears tricuspid and non-restricted. There is no evidence of aortic valve regurgitation. The peak instantaneous gradient of the aortic valve is 3.6 mmHg. Mitral Valve: The mitral valve is normal in structure. There is normal mitral valve leaflet mobility. There is mild to moderate mitral valve regurgitation which is centrally directed. Tricuspid Valve: The tricuspid valve is structurally normal. There is normal tricuspid valve leaflet mobility. There is moderate to severe tricuspid regurgitation. The Doppler estimated RVSP is moderate to severely elevated at 60.9 mmHg. Pulmonic Valve: The pulmonic valve is structurally normal. There is mild to moderate pulmonic valve  regurgitation. Pericardium: There is no pericardial effusion noted. Aorta: The aortic root is normal. The Ao Sinus is 3.20 cm. The Asc Ao is 3.20 cm. There is no dilatation of the aortic arch. There is no dilatation of the ascending aorta. There is no dilatation of the aortic root. Pulmonary Artery: The pulmonary artery is normal in size. The tricuspid regurgitant velocity is 3.64 m/s, and with an estimated right atrial pressure of 8 mmHg, the estimated pulmonary artery pressure is moderate to severely elevated with the RVSP at 60.9 mmHg. The estimated PASP is 60 mmHg. Systemic Veins: The inferior vena cava appears mildly dilated.  CONCLUSIONS:  1. Left ventricular systolic function is low normal with a 55% estimated ejection fraction.  2. Abnormal septal motion consistent with RV pacemaker.  3. The left atrium is mild to moderately dilated.  4. The right atrium is mild to moderately dilated.  5. Mild to moderate mitral valve regurgitation.  6. Moderate to severe tricuspid regurgitation visualized.  7. Moderate to severely elevated right ventricular systolic pressure.  8. Moderate to severely elevated pulmonary artery pressure.  9. The estimated PASP is 60 mmHg. QUANTITATIVE DATA SUMMARY: 2D MEASUREMENTS:                          Normal Ranges: Ao Root d:     3.20 cm   (2.0-3.7cm) LAs:           4.04 cm   (2.7-4.0cm) IVSd:          1.09 cm   (0.6-1.1cm) LVPWd:         0.62 cm   (0.6-1.1cm) LVIDd:         4.99 cm   (3.9-5.9cm) LVIDs:         3.40 cm LV Mass Index: 79.2 g/m2 LV % FS        31.9 % LA VOLUME:                              Normal Ranges: LA Vol A4C:       77.3 ml    (22+/-6mL/m2) LA Vol A2C:       71.1 ml LA Vol BP:        78.3 ml LA Vol Index A4C: 41.3ml/m2 LA Vol Index A2C: 38.0 ml/m2 LA Vol Index BP:  41.9 ml/m2 LA Volume Index:  41.9 ml/m2 LA Vol A4C:       69.8 ml LA Vol A2C:       69.1 ml RA VOLUME BY A/L METHOD:                       Normal Ranges: RA Area A4C: 20.0 cm2 AORTA MEASUREMENTS:                       Normal Ranges: Ao Sinus, d: 3.20 cm (2.1-3.5cm) Asc Ao, d:   3.20 cm (2.1-3.4cm) LV SYSTOLIC FUNCTION BY 2D PLANIMETRY (MOD):                     Normal Ranges: EF-A4C View: 58.1 % (>=55%) EF-A2C View: 54.3 % EF-Biplane:  56.6 % LV DIASTOLIC FUNCTION:                     Normal Ranges: MV Peak E: 0.89 m/s (0.7-1.2 m/s) MV DT:     190 msec (150-240 msec) MITRAL VALVE:                 Normal Ranges: MV DT: 190 msec (150-240msec) AORTIC VALVE:                         Normal Ranges: AoV Vmax:      0.94 m/s (<=1.7m/s) AoV Peak PG:   3.6 mmHg (<20mmHg) LVOT Max Nnamdi:  0.70 m/s (<=1.1m/s) LVOT VTI:      17.15 cm LVOT Diameter: 2.17 cm  (1.8-2.4cm) AoV Area,Vmax: 2.73 cm2 (2.5-4.5cm2)  RIGHT VENTRICLE: RV Basal 3.50 cm RV Mid   2.70 cm RV Major 6.2 cm TAPSE:   19.0 mm RV s'    0.10 m/s TRICUSPID VALVE/RVSP:                             Normal Ranges: Peak TR Velocity: 3.64 m/s RV Syst Pressure: 60.9 mmHg (< 30mmHg) IVC Diam:         2.00 cm PULMONIC VALVE:                         Normal Ranges: PV Accel Time: 57 msec  (>120ms) PV Max Nnamdi:    0.8 m/s  (0.6-0.9m/s) PV Max P.4 mmHg  [id number] Romeo Gaines MD Electronically signed on 10/21/2023 at 8:56:34 PM  ** Final **           Lab review: I have Chemistry CMP:   Lab Results   Component Value Date    ALBUMIN 4.3 2023    CALCIUM 9.8 2023    CO2 30 2023    CREATININE 1.29 (H) 2023    GLUCOSE 115 (H) 2023    BILITOT 0.9 2023    PROT 7.4 2023    ALT 19 2023    AST 20 2023    ALKPHOS 99 2023   , Chemistry BMP   Lab Results   Component Value Date    GLUCOSE 115 (H) 2023    CALCIUM 9.8 2023    CO2 30 2023    CREATININE 1.29 (H) 2023   , and CBC:  Lab Results   Component Value Date    WBC 6.4 2023    RBC 4.23 2023    HGB 12.0 2023    HCT 39.6 2023    MCV 94 2023    MCH 28.4 2023    MCHC 30.3 (L) 2023    RDW 14.2 2023    NRBC 0.0  11/01/2023     TSH normal   Assessment/Plan   Problem List Items Addressed This Visit    None  Visit Diagnoses         Codes    Nonischemic cardiomyopathy (Multi)     I42.8        The rhythm is sinus rhythm. We increased her atrial output today ensure capture consistently. This may help with the palpitation and shortness of breath. She will have a remote in 1 month. Return in 6  months for in clinic check.     Kristie Valenzuela MD

## 2024-04-17 NOTE — PATIENT INSTRUCTIONS
Please continue the apixaban ( Eliquis) for stroke risk reduction.    A remote pacemaker reading will happen in 1 month.  Return in 6 months - October -2024 to see pacemaker clinic and Fanny Cameron CNP ( Dr Valenzuela's nurse practitioner) . We will see you every 6 months to follow the amiodarone.    Call if any issues [phone].     Remember to use sunscreen when you are outside because the amiodarone can make your skin sensitive.

## 2024-04-18 DIAGNOSIS — I48.91 ATRIAL FIBRILLATION, UNSPECIFIED TYPE (MULTI): Primary | ICD-10-CM

## 2024-04-22 DIAGNOSIS — I48.91 ATRIAL FIBRILLATION, UNSPECIFIED TYPE (MULTI): ICD-10-CM

## 2024-04-22 RX ORDER — AMIODARONE HYDROCHLORIDE 200 MG/1
200 TABLET ORAL DAILY
Qty: 90 TABLET | Refills: 3 | Status: SHIPPED | OUTPATIENT
Start: 2024-04-22 | End: 2024-04-22 | Stop reason: SDUPTHER

## 2024-04-22 RX ORDER — AMIODARONE HYDROCHLORIDE 200 MG/1
200 TABLET ORAL DAILY
Qty: 90 TABLET | Refills: 3 | Status: SHIPPED | OUTPATIENT
Start: 2024-04-22

## 2024-05-06 DIAGNOSIS — E03.9 HYPOTHYROIDISM, UNSPECIFIED TYPE: ICD-10-CM

## 2024-05-06 RX ORDER — LEVOTHYROXINE SODIUM 75 UG/1
75 TABLET ORAL
Qty: 10 TABLET | Refills: 0 | Status: SHIPPED | OUTPATIENT
Start: 2024-05-06 | End: 2024-05-15 | Stop reason: SDUPTHER

## 2024-05-13 ENCOUNTER — APPOINTMENT (OUTPATIENT)
Dept: PRIMARY CARE | Facility: CLINIC | Age: 84
End: 2024-05-13
Payer: COMMERCIAL

## 2024-05-14 ENCOUNTER — APPOINTMENT (OUTPATIENT)
Dept: LAB | Facility: LAB | Age: 84
End: 2024-05-14
Payer: MEDICARE

## 2024-05-14 ENCOUNTER — OFFICE VISIT (OUTPATIENT)
Dept: PRIMARY CARE | Facility: CLINIC | Age: 84
End: 2024-05-14
Payer: MEDICARE

## 2024-05-14 VITALS
DIASTOLIC BLOOD PRESSURE: 77 MMHG | OXYGEN SATURATION: 94 % | HEART RATE: 59 BPM | HEIGHT: 63 IN | BODY MASS INDEX: 35.08 KG/M2 | SYSTOLIC BLOOD PRESSURE: 128 MMHG | WEIGHT: 198 LBS

## 2024-05-14 DIAGNOSIS — N18.31 STAGE 3A CHRONIC KIDNEY DISEASE (MULTI): ICD-10-CM

## 2024-05-14 DIAGNOSIS — K59.00 CONSTIPATION, UNSPECIFIED CONSTIPATION TYPE: ICD-10-CM

## 2024-05-14 DIAGNOSIS — E78.00 HYPERCHOLESTEREMIA: ICD-10-CM

## 2024-05-14 DIAGNOSIS — G47.00 INSOMNIA, UNSPECIFIED TYPE: ICD-10-CM

## 2024-05-14 DIAGNOSIS — Z00.00 MEDICARE ANNUAL WELLNESS VISIT, SUBSEQUENT: ICD-10-CM

## 2024-05-14 DIAGNOSIS — R73.9 HYPERGLYCEMIA: ICD-10-CM

## 2024-05-14 DIAGNOSIS — I10 BENIGN ESSENTIAL HYPERTENSION: ICD-10-CM

## 2024-05-14 DIAGNOSIS — Z00.00 HEALTHCARE MAINTENANCE: ICD-10-CM

## 2024-05-14 DIAGNOSIS — E03.9 HYPOTHYROIDISM, UNSPECIFIED TYPE: ICD-10-CM

## 2024-05-14 DIAGNOSIS — I48.91 ATRIAL FIBRILLATION, UNSPECIFIED TYPE (MULTI): Primary | ICD-10-CM

## 2024-05-14 PROBLEM — R07.2 PRECORDIAL PAIN: Status: ACTIVE | Noted: 2019-04-15

## 2024-05-14 PROBLEM — H10.9 CONJUNCTIVITIS: Status: ACTIVE | Noted: 2024-05-14

## 2024-05-14 PROBLEM — R00.2 PALPITATIONS: Status: ACTIVE | Noted: 2024-05-14

## 2024-05-14 PROBLEM — H66.009 ACUTE SUPPURATIVE OTITIS MEDIA WITHOUT SPONTANEOUS RUPTURE OF EAR DRUM: Status: ACTIVE | Noted: 2019-04-15

## 2024-05-14 PROBLEM — I42.9 CARDIOMYOPATHY (MULTI): Status: ACTIVE | Noted: 2024-04-17

## 2024-05-14 PROBLEM — L84 CALLUS: Status: ACTIVE | Noted: 2024-05-14

## 2024-05-14 PROCEDURE — 1159F MED LIST DOCD IN RCRD: CPT | Performed by: INTERNAL MEDICINE

## 2024-05-14 PROCEDURE — 1036F TOBACCO NON-USER: CPT | Performed by: INTERNAL MEDICINE

## 2024-05-14 PROCEDURE — 99397 PER PM REEVAL EST PAT 65+ YR: CPT | Performed by: INTERNAL MEDICINE

## 2024-05-14 PROCEDURE — 1170F FXNL STATUS ASSESSED: CPT | Performed by: INTERNAL MEDICINE

## 2024-05-14 PROCEDURE — 3074F SYST BP LT 130 MM HG: CPT | Performed by: INTERNAL MEDICINE

## 2024-05-14 PROCEDURE — 1160F RVW MEDS BY RX/DR IN RCRD: CPT | Performed by: INTERNAL MEDICINE

## 2024-05-14 PROCEDURE — 99214 OFFICE O/P EST MOD 30 MIN: CPT | Performed by: INTERNAL MEDICINE

## 2024-05-14 PROCEDURE — 1158F ADVNC CARE PLAN TLK DOCD: CPT | Performed by: INTERNAL MEDICINE

## 2024-05-14 PROCEDURE — G0439 PPPS, SUBSEQ VISIT: HCPCS | Performed by: INTERNAL MEDICINE

## 2024-05-14 PROCEDURE — 1123F ACP DISCUSS/DSCN MKR DOCD: CPT | Performed by: INTERNAL MEDICINE

## 2024-05-14 PROCEDURE — 3078F DIAST BP <80 MM HG: CPT | Performed by: INTERNAL MEDICINE

## 2024-05-14 ASSESSMENT — ACTIVITIES OF DAILY LIVING (ADL)
GROCERY_SHOPPING: INDEPENDENT
MANAGING_FINANCES: INDEPENDENT
DRESSING: INDEPENDENT
BATHING: INDEPENDENT
DOING_HOUSEWORK: INDEPENDENT
TAKING_MEDICATION: INDEPENDENT

## 2024-05-14 ASSESSMENT — COLUMBIA-SUICIDE SEVERITY RATING SCALE - C-SSRS
6. HAVE YOU EVER DONE ANYTHING, STARTED TO DO ANYTHING, OR PREPARED TO DO ANYTHING TO END YOUR LIFE?: NO
1. IN THE PAST MONTH, HAVE YOU WISHED YOU WERE DEAD OR WISHED YOU COULD GO TO SLEEP AND NOT WAKE UP?: NO
2. HAVE YOU ACTUALLY HAD ANY THOUGHTS OF KILLING YOURSELF?: NO

## 2024-05-14 ASSESSMENT — ENCOUNTER SYMPTOMS
LIGHT-HEADEDNESS: 0
APPETITE CHANGE: 0
DYSURIA: 0
FEVER: 0
ARTHRALGIAS: 1
CHILLS: 0
NUMBNESS: 0
DIZZINESS: 0
SINUS PRESSURE: 0
ABDOMINAL DISTENTION: 0
ABDOMINAL PAIN: 0
NECK STIFFNESS: 0
NECK PAIN: 0
DIAPHORESIS: 0
SHORTNESS OF BREATH: 0
JOINT SWELLING: 0
HEMATURIA: 0
PALPITATIONS: 0
NAUSEA: 0
BACK PAIN: 1
WEAKNESS: 0
RHINORRHEA: 0
WHEEZING: 0
VOMITING: 0
DIFFICULTY URINATING: 0
MYALGIAS: 0
FATIGUE: 0
CONSTIPATION: 0
SORE THROAT: 0
FREQUENCY: 0
BLOOD IN STOOL: 0
HEADACHES: 0
COUGH: 0
DIARRHEA: 0

## 2024-05-14 ASSESSMENT — PATIENT HEALTH QUESTIONNAIRE - PHQ9
2. FEELING DOWN, DEPRESSED OR HOPELESS: NOT AT ALL
SUM OF ALL RESPONSES TO PHQ9 QUESTIONS 1 AND 2: 0
1. LITTLE INTEREST OR PLEASURE IN DOING THINGS: NOT AT ALL

## 2024-05-14 NOTE — PROGRESS NOTES
"Subjective   Patient ID: Laurie Ozuna is a 83 y.o. female who presents for Medicare Annual Wellness Visit Subsequent and Establish Care.    HPI   She presents to establish care.  She complains of lower back pain and stiffness with in the beginning of the day improves as the day progresses then becomes loose at the end of the day.  She also complains of constipation    Review of Systems   Constitutional:  Negative for appetite change, chills, diaphoresis, fatigue and fever.   HENT:  Negative for congestion, ear discharge, ear pain, hearing loss, postnasal drip, rhinorrhea, sinus pressure, sore throat and tinnitus.    Eyes:  Negative for visual disturbance.   Respiratory:  Negative for cough, shortness of breath and wheezing.    Cardiovascular:  Negative for chest pain, palpitations and leg swelling.   Gastrointestinal:  Negative for abdominal distention, abdominal pain, blood in stool, constipation, diarrhea, nausea and vomiting.   Genitourinary:  Negative for decreased urine volume, difficulty urinating, dysuria, frequency, hematuria and urgency.   Musculoskeletal:  Positive for arthralgias and back pain. Negative for gait problem, joint swelling, myalgias, neck pain and neck stiffness.   Skin:  Negative for rash.   Neurological:  Negative for dizziness, weakness, light-headedness, numbness and headaches.           Objective   /77   Pulse 59   Ht 1.6 m (5' 3\")   Wt 89.8 kg (198 lb)   SpO2 94%   BMI 35.07 kg/m²     Physical Exam  Neck:      Thyroid: No thyromegaly.   Cardiovascular:      Rate and Rhythm: Normal rate and regular rhythm.      Heart sounds: Normal heart sounds, S1 normal and S2 normal.   Pulmonary:      Effort: Pulmonary effort is normal.      Breath sounds: Normal breath sounds and air entry.   Musculoskeletal:      Right lower leg: No edema.      Left lower leg: No edema.           Assessment/Plan   Problem List Items Addressed This Visit             ICD-10-CM    Benign essential " hypertension I10     -BP within target goal 140/90  -CMP, TSH ordered  -Continue candesartan 32 mg daily, metoprolol 25 mg daily  -Keep BP log  -Educated about adverse effects of uncontrolled blood pressure,    -Low sodium diet, regular exercise recommended           Relevant Orders    CBC and Auto Differential    Comprehensive Metabolic Panel    TSH with reflex to Free T4 if abnormal    Constipation K59.00     Take metamucil or miralax PRN. Increase fiber and water intake. Exercise for 30 mins daily as tolerated          Hyperglycemia R73.9    Relevant Orders    Hemoglobin A1C    Hypothyroidism E03.9     Repeat TFT   Continue levothyroxine 75 mcg         Insomnia G47.00    Atrial fibrillation (Multi) - Primary I48.91     Stable on current regimen.   Continue amiodarone 200 mg daily, metoprolol XL 25 mg daily and Eliquis 5 mg twice daily  Follow with cardiology          Relevant Orders    CBC and Auto Differential    Hypercholesteremia E78.00     Continue atorvastatin 20mg.          Relevant Orders    Lipid Panel Non-Fasting    Lipid Panel    Stage 3a chronic kidney disease (Multi) N18.31     ACE/ARB contraindicated d/t allergy. No SGLT2 inhibitor d/t cost. Encouraged strict control of BP. Avoid NSAIDs. Drink plenty of water.          Relevant Orders    CBC and Auto Differential    Comprehensive Metabolic Panel    Medicare annual wellness visit, subsequent Z00.00    Healthcare maintenance Z00.00    Relevant Orders    CBC and Auto Differential    Comprehensive Metabolic Panel    Hemoglobin A1C    TSH with reflex to Free T4 if abnormal     RTC in 6 mo

## 2024-05-14 NOTE — ASSESSMENT & PLAN NOTE
Stable on current regimen.   Continue amiodarone 200 mg daily, metoprolol XL 25 mg daily and Eliquis 5 mg twice daily  Follow with cardiology

## 2024-05-14 NOTE — ASSESSMENT & PLAN NOTE
-BP within target goal 140/90  -CMP, TSH ordered  -Continue candesartan 32 mg daily, metoprolol 25 mg daily  -Keep BP log  -Educated about adverse effects of uncontrolled blood pressure,    -Low sodium diet, regular exercise recommended

## 2024-05-15 ENCOUNTER — LAB (OUTPATIENT)
Dept: LAB | Facility: LAB | Age: 84
End: 2024-05-15
Payer: MEDICARE

## 2024-05-15 DIAGNOSIS — N18.31 STAGE 3A CHRONIC KIDNEY DISEASE (MULTI): ICD-10-CM

## 2024-05-15 DIAGNOSIS — I48.91 ATRIAL FIBRILLATION, UNSPECIFIED TYPE (MULTI): ICD-10-CM

## 2024-05-15 DIAGNOSIS — Z00.00 HEALTHCARE MAINTENANCE: ICD-10-CM

## 2024-05-15 DIAGNOSIS — E78.00 HYPERCHOLESTEREMIA: ICD-10-CM

## 2024-05-15 DIAGNOSIS — R73.9 HYPERGLYCEMIA: ICD-10-CM

## 2024-05-15 DIAGNOSIS — E03.9 HYPOTHYROIDISM, UNSPECIFIED TYPE: ICD-10-CM

## 2024-05-15 DIAGNOSIS — I10 BENIGN ESSENTIAL HYPERTENSION: ICD-10-CM

## 2024-05-15 LAB
ALBUMIN SERPL BCP-MCNC: 4 G/DL (ref 3.4–5)
ALP SERPL-CCNC: 99 U/L (ref 33–136)
ALT SERPL W P-5'-P-CCNC: 16 U/L (ref 7–45)
ANION GAP SERPL CALC-SCNC: 16 MMOL/L (ref 10–20)
AST SERPL W P-5'-P-CCNC: 22 U/L (ref 9–39)
BASOPHILS # BLD AUTO: 0.05 X10*3/UL (ref 0–0.1)
BASOPHILS NFR BLD AUTO: 1.2 %
BILIRUB SERPL-MCNC: 0.8 MG/DL (ref 0–1.2)
BUN SERPL-MCNC: 31 MG/DL (ref 6–23)
CALCIUM SERPL-MCNC: 9 MG/DL (ref 8.6–10.6)
CHLORIDE SERPL-SCNC: 103 MMOL/L (ref 98–107)
CHOLEST SERPL-MCNC: 135 MG/DL (ref 0–199)
CHOLEST SERPL-MCNC: 135 MG/DL (ref 0–199)
CHOLESTEROL/HDL RATIO: 2.2
CHOLESTEROL/HDL RATIO: 2.2
CO2 SERPL-SCNC: 28 MMOL/L (ref 21–32)
CREAT SERPL-MCNC: 1.24 MG/DL (ref 0.5–1.05)
EGFRCR SERPLBLD CKD-EPI 2021: 43 ML/MIN/1.73M*2
EOSINOPHIL # BLD AUTO: 0.1 X10*3/UL (ref 0–0.4)
EOSINOPHIL NFR BLD AUTO: 2.3 %
ERYTHROCYTE [DISTWIDTH] IN BLOOD BY AUTOMATED COUNT: 13.9 % (ref 11.5–14.5)
EST. AVERAGE GLUCOSE BLD GHB EST-MCNC: 126 MG/DL
GLUCOSE SERPL-MCNC: 94 MG/DL (ref 74–99)
HBA1C MFR BLD: 6 %
HCT VFR BLD AUTO: 38.6 % (ref 36–46)
HDLC SERPL-MCNC: 61.5 MG/DL
HDLC SERPL-MCNC: 61.5 MG/DL
HGB BLD-MCNC: 11.9 G/DL (ref 12–16)
IMM GRANULOCYTES # BLD AUTO: 0.01 X10*3/UL (ref 0–0.5)
IMM GRANULOCYTES NFR BLD AUTO: 0.2 % (ref 0–0.9)
LDLC SERPL CALC-MCNC: 62 MG/DL
LYMPHOCYTES # BLD AUTO: 1.32 X10*3/UL (ref 0.8–3)
LYMPHOCYTES NFR BLD AUTO: 30.9 %
MCH RBC QN AUTO: 29.2 PG (ref 26–34)
MCHC RBC AUTO-ENTMCNC: 30.8 G/DL (ref 32–36)
MCV RBC AUTO: 95 FL (ref 80–100)
MONOCYTES # BLD AUTO: 0.39 X10*3/UL (ref 0.05–0.8)
MONOCYTES NFR BLD AUTO: 9.1 %
NEUTROPHILS # BLD AUTO: 2.4 X10*3/UL (ref 1.6–5.5)
NEUTROPHILS NFR BLD AUTO: 56.3 %
NON HDL CHOLESTEROL: 74 MG/DL (ref 0–149)
NON-HDL CHOLESTEROL: 74 MG/DL (ref 0–149)
NRBC BLD-RTO: 0 /100 WBCS (ref 0–0)
PLATELET # BLD AUTO: 172 X10*3/UL (ref 150–450)
POTASSIUM SERPL-SCNC: 4.8 MMOL/L (ref 3.5–5.3)
PROT SERPL-MCNC: 6.9 G/DL (ref 6.4–8.2)
RBC # BLD AUTO: 4.08 X10*6/UL (ref 4–5.2)
SODIUM SERPL-SCNC: 142 MMOL/L (ref 136–145)
TRIGL SERPL-MCNC: 56 MG/DL (ref 0–149)
TSH SERPL-ACNC: 0.88 MIU/L (ref 0.44–3.98)
VLDL: 11 MG/DL (ref 0–40)
WBC # BLD AUTO: 4.3 X10*3/UL (ref 4.4–11.3)

## 2024-05-15 PROCEDURE — 85025 COMPLETE CBC W/AUTO DIFF WBC: CPT

## 2024-05-15 PROCEDURE — 83718 ASSAY OF LIPOPROTEIN: CPT

## 2024-05-15 PROCEDURE — 80061 LIPID PANEL: CPT

## 2024-05-15 PROCEDURE — 83036 HEMOGLOBIN GLYCOSYLATED A1C: CPT

## 2024-05-15 PROCEDURE — 80053 COMPREHEN METABOLIC PANEL: CPT

## 2024-05-15 PROCEDURE — 84443 ASSAY THYROID STIM HORMONE: CPT

## 2024-05-15 PROCEDURE — 82465 ASSAY BLD/SERUM CHOLESTEROL: CPT

## 2024-05-15 PROCEDURE — 36415 COLL VENOUS BLD VENIPUNCTURE: CPT

## 2024-05-15 NOTE — RESULT ENCOUNTER NOTE
CKd stage III, stable.  Prediabetes, stable.  Will continue to monitor.    Labs were otherwise unremarkable.

## 2024-05-16 ENCOUNTER — TELEPHONE (OUTPATIENT)
Dept: PRIMARY CARE | Facility: CLINIC | Age: 84
End: 2024-05-16
Payer: COMMERCIAL

## 2024-05-16 RX ORDER — LEVOTHYROXINE SODIUM 75 UG/1
75 TABLET ORAL
Qty: 90 TABLET | Refills: 1 | Status: SHIPPED | OUTPATIENT
Start: 2024-05-16

## 2024-05-16 NOTE — TELEPHONE ENCOUNTER
Result Communication    Resulted Orders   CBC and Auto Differential   Result Value Ref Range    WBC 4.3 (L) 4.4 - 11.3 x10*3/uL    nRBC 0.0 0.0 - 0.0 /100 WBCs    RBC 4.08 4.00 - 5.20 x10*6/uL    Hemoglobin 11.9 (L) 12.0 - 16.0 g/dL    Hematocrit 38.6 36.0 - 46.0 %    MCV 95 80 - 100 fL    MCH 29.2 26.0 - 34.0 pg    MCHC 30.8 (L) 32.0 - 36.0 g/dL    RDW 13.9 11.5 - 14.5 %    Platelets 172 150 - 450 x10*3/uL    Neutrophils % 56.3 40.0 - 80.0 %    Immature Granulocytes %, Automated 0.2 0.0 - 0.9 %      Comment:      Immature Granulocyte Count (IG) includes promyelocytes, myelocytes and metamyelocytes but does not include bands. Percent differential counts (%) should be interpreted in the context of the absolute cell counts (cells/UL).    Lymphocytes % 30.9 13.0 - 44.0 %    Monocytes % 9.1 2.0 - 10.0 %    Eosinophils % 2.3 0.0 - 6.0 %    Basophils % 1.2 0.0 - 2.0 %    Neutrophils Absolute 2.40 1.60 - 5.50 x10*3/uL      Comment:      Percent differential counts (%) should be interpreted in the context of the absolute cell counts (cells/uL).    Immature Granulocytes Absolute, Automated 0.01 0.00 - 0.50 x10*3/uL    Lymphocytes Absolute 1.32 0.80 - 3.00 x10*3/uL    Monocytes Absolute 0.39 0.05 - 0.80 x10*3/uL    Eosinophils Absolute 0.10 0.00 - 0.40 x10*3/uL    Basophils Absolute 0.05 0.00 - 0.10 x10*3/uL   Comprehensive Metabolic Panel   Result Value Ref Range    Glucose 94 74 - 99 mg/dL    Sodium 142 136 - 145 mmol/L    Potassium 4.8 3.5 - 5.3 mmol/L    Chloride 103 98 - 107 mmol/L    Bicarbonate 28 21 - 32 mmol/L    Anion Gap 16 10 - 20 mmol/L    Urea Nitrogen 31 (H) 6 - 23 mg/dL    Creatinine 1.24 (H) 0.50 - 1.05 mg/dL    eGFR 43 (L) >60 mL/min/1.73m*2      Comment:      Calculations of estimated GFR are performed using the 2021 CKD-EPI Study Refit equation without the race variable for the IDMS-Traceable creatinine methods.  https://jasn.asnjournals.org/content/early/2021/09/22/ASN.1511787292    Calcium 9.0 8.6 - 10.6  mg/dL    Albumin 4.0 3.4 - 5.0 g/dL    Alkaline Phosphatase 99 33 - 136 U/L    Total Protein 6.9 6.4 - 8.2 g/dL    AST 22 9 - 39 U/L    Bilirubin, Total 0.8 0.0 - 1.2 mg/dL    ALT 16 7 - 45 U/L      Comment:      Patients treated with Sulfasalazine may generate falsely decreased results for ALT.   Hemoglobin A1C   Result Value Ref Range    Hemoglobin A1C 6.0 (H) see below %    Estimated Average Glucose 126 Not Established mg/dL    Narrative    Diagnosis of Diabetes-Adults  Non-Diabetic: < or = 5.6%  Increased risk for developing diabetes: 5.7-6.4%  Diagnostic of diabetes: > or = 6.5%    Monitoring of Diabetes  Age (y)....................... Therapeutic Goal (%)  Adults: >18.........................<7.0  Pediatrics: 13-18...................<7.5  Pediatrics: 7-12....................<8.0  Pediatrics: 0-6..................... 7.5-8.5    American Diabetes Association. Diabetes Care 33(S1), Jan 2010       TSH with reflex to Free T4 if abnormal   Result Value Ref Range    Thyroid Stimulating Hormone 0.88 0.44 - 3.98 mIU/L    Narrative    TSH testing is performed using different testing methodology at [facility] than at other [facility] hospitals. Direct result comparisons should only be made within the same method.     Lipid Panel Non-Fasting   Result Value Ref Range    Cholesterol 135 0 - 199 mg/dL      Comment:      Age      Desirable   Borderline High   High     0-19 Y     0 - 169       170 - 199     >/= 200    20-24 Y     0 - 189       190 - 224     >/= 225         >24 Y     0 - 199       200 - 239     >/= 240   **All ranges are based on fasting samples. Specific   therapeutic targets will vary based on patient-specific   cardiac risk.    Pediatric guidelines reference:Pediatrics 2011, 128(S5).Adult guidelines reference: NCEP ATPIII Guidelines,GAYATRI 2001, 258:2486-97    Venipuncture immediately after or during the administration of Metamizole may lead to falsely low results. Testing should be performed  immediately prior to Metamizole dosing.        Age      Desirable   Borderline High   High     0-19 Y     0 - 169       170 - 199     >/= 200    20-24 Y     0 - 189       190 - 224     >/= 225         >24 Y     0 - 199       200 - 239     >/= 240   **All ranges are based on fasting samples. Specific   therapeutic targets will vary based on patient-specific   cardiac risk.    Pediatric guidelines  reference:Pediatrics 2011, 128(S5).Adult guidelines reference: NCEP ATPIII Guidelines,GAYATRI 2001, 258:2486-97    Venipuncture immediately after or during the administration of Metamizole may lead to falsely low results. Testing should be performed immediately prior to Metamizole dosing.    HDL-Cholesterol 61.5 mg/dL      Comment:      Age       Very Low   Low     Normal    High    0-19 Y    < 35      < 40     40-45     ----  20-24 Y    ----     < 40      >45      ----        >24 Y      ----     < 40     40-60      >60    Age       Very Low   Low     Normal    High    0-19 Y    < 35      < 40     40-45     ----  20-24 Y    ----     < 40      >45      ----        >24 Y      ----     < 40     40-60      >60      Cholesterol/HDL Ratio 2.2       Comment:      Ref Values  Desirable  < 3.4  High Risk  > 5.0    Ref Values  Desirable  < 3.4  High Risk  > 5.0    Non-HDL Cholesterol 74 0 - 149 mg/dL      Comment:         Age        Desiable   Borderline High   High     Very High   0-19 Y     0 - 119       120 - 144     >/= 145    >/= 160  20-24 Y     0 - 149       150 - 189     >/= 190      ----       >24 Y    30 MG/DL ABOVE LDL CHOLESTEROL GOAL   Lipid Panel   Result Value Ref Range    Cholesterol 135 0 - 199 mg/dL      Comment:            Age      Desirable   Borderline High   High     0-19 Y     0 - 169       170 - 199     >/= 200    20-24 Y     0 - 189       190 - 224     >/= 225         >24 Y     0 - 199       200 - 239     >/= 240   **All ranges are based on fasting samples. Specific   therapeutic targets will vary based on  patient-specific   cardiac risk.    Pediatric guidelines reference:Pediatrics 2011, 128(S5).Adult guidelines reference: NCEP ATPIII Guidelines,GAYATRI 2001, 258:2486-97    Venipuncture immediately after or during the administration of Metamizole may lead to falsely low results. Testing should be performed immediately prior to Metamizole dosing.    HDL-Cholesterol 61.5 mg/dL      Comment:        Age       Very Low   Low     Normal    High    0-19 Y    < 35      < 40     40-45     ----  20-24 Y    ----     < 40      >45      ----        >24 Y      ----     < 40     40-60      >60      Cholesterol/HDL Ratio 2.2       Comment:        Ref Values  Desirable  < 3.4  High Risk  > 5.0    LDL Calculated 62 <=99 mg/dL      Comment:                                  Near   Borderline      AGE      Desirable  Optimal    High     High     Very High     0-19 Y     0 - 109     ---    110-129   >/= 130     ----    20-24 Y     0 - 119     ---    120-159   >/= 160     ----      >24 Y     0 -  99   100-129  130-159   160-189     >/=190      VLDL 11 0 - 40 mg/dL    Triglycerides 56 0 - 149 mg/dL      Comment:         Age         Desirable   Borderline High   High     Very High   0 D-90 D    19 - 174         ----         ----        ----  91 D- 9 Y     0 -  74        75 -  99     >/= 100      ----    10-19 Y     0 -  89        90 - 129     >/= 130      ----    20-24 Y     0 - 114       115 - 149     >/= 150      ----         >24 Y     0 - 149       150 - 199    200- 499    >/= 500    Venipuncture immediately after or during the administration of Metamizole may lead to falsely low results. Testing should be performed immediately prior to Metamizole dosing.    Non HDL Cholesterol 74 0 - 149 mg/dL      Comment:            Age       Desirable   Borderline High   High     Very High     0-19 Y     0 - 119       120 - 144     >/= 145    >/= 160    20-24 Y     0 - 149       150 - 189     >/= 190      ----         >24 Y    30 mg/dL above LDL  Cholesterol goal         10:10 AM      Results were successfully communicated with the patient and they acknowledged their understanding.

## 2024-05-16 NOTE — TELEPHONE ENCOUNTER
----- Message from Lenard Stout MD sent at 5/15/2024  3:34 PM EDT -----  CKd stage III, stable.  Prediabetes, stable.  Will continue to monitor.    Labs were otherwise unremarkable.

## 2024-05-20 ENCOUNTER — HOSPITAL ENCOUNTER (OUTPATIENT)
Dept: CARDIOLOGY | Facility: CLINIC | Age: 84
Discharge: HOME | End: 2024-05-20
Payer: COMMERCIAL

## 2024-05-20 DIAGNOSIS — Z95.810 PRESENCE OF AUTOMATIC (IMPLANTABLE) CARDIAC DEFIBRILLATOR: ICD-10-CM

## 2024-05-20 DIAGNOSIS — I49.5 SICK SINUS SYNDROME (MULTI): ICD-10-CM

## 2024-06-04 ENCOUNTER — APPOINTMENT (OUTPATIENT)
Dept: OPHTHALMOLOGY | Facility: CLINIC | Age: 84
End: 2024-06-04
Payer: MEDICARE

## 2024-06-04 ENCOUNTER — CLINICAL SUPPORT (OUTPATIENT)
Dept: OPHTHALMOLOGY | Facility: CLINIC | Age: 84
End: 2024-06-04
Payer: MEDICARE

## 2024-06-04 ENCOUNTER — OFFICE VISIT (OUTPATIENT)
Dept: OPHTHALMOLOGY | Facility: CLINIC | Age: 84
End: 2024-06-04
Payer: MEDICARE

## 2024-06-04 DIAGNOSIS — H02.831 DERMATOCHALASIS OF BOTH UPPER EYELIDS: ICD-10-CM

## 2024-06-04 DIAGNOSIS — H35.371 PUCKERING OF MACULA, RIGHT EYE: Primary | ICD-10-CM

## 2024-06-04 DIAGNOSIS — H04.123 INSUFFICIENCY OF TEAR FILM OF BOTH EYES: ICD-10-CM

## 2024-06-04 DIAGNOSIS — Z96.1 ARTIFICIAL LENS PRESENT: ICD-10-CM

## 2024-06-04 DIAGNOSIS — H02.834 DERMATOCHALASIS OF BOTH UPPER EYELIDS: ICD-10-CM

## 2024-06-04 DIAGNOSIS — H52.7 UNSPECIFIED DISORDER OF REFRACTION: ICD-10-CM

## 2024-06-04 PROBLEM — H10.9 CONJUNCTIVITIS: Status: RESOLVED | Noted: 2024-05-14 | Resolved: 2024-06-04

## 2024-06-04 PROCEDURE — 92134 CPTRZ OPH DX IMG PST SGM RTA: CPT | Performed by: OPHTHALMOLOGY

## 2024-06-04 PROCEDURE — 99214 OFFICE O/P EST MOD 30 MIN: CPT | Performed by: OPHTHALMOLOGY

## 2024-06-04 PROCEDURE — 92015 DETERMINE REFRACTIVE STATE: CPT | Performed by: OPHTHALMOLOGY

## 2024-06-04 ASSESSMENT — VISUAL ACUITY
METHOD: SNELLEN - SINGLE
OD_CC+: -1
OS_CC+: +1
OS_CC: 20/30
OD_CC: 20/30
CORRECTION_TYPE: GLASSES

## 2024-06-04 ASSESSMENT — REFRACTION_MANIFEST
METHOD_AUTOREFRACTION: 1
OD_AXIS: 090
OD_SPHERE: +1.25
OS_SPHERE: +1.25
OS_CYLINDER: -2.00
OD_CYLINDER: -1.50
OS_AXIS: 095

## 2024-06-04 ASSESSMENT — KERATOMETRY
OD_AXISANGLE_DEGREES: 5
OD_AXISANGLE2_DEGREES: 95
OS_K2POWER_DIOPTERS: 46.00
OD_K1POWER_DIOPTERS: 45.00
OD_K2POWER_DIOPTERS: 46.00
OS_K1POWER_DIOPTERS: 44.50
OS_AXISANGLE2_DEGREES: 95
OS_AXISANGLE_DEGREES: 5
METHOD_AUTO_MANUAL: AUTOMATED

## 2024-06-04 ASSESSMENT — ENCOUNTER SYMPTOMS
EYES NEGATIVE: 0
ALLERGIC/IMMUNOLOGIC NEGATIVE: 0
RESPIRATORY NEGATIVE: 0
HEMATOLOGIC/LYMPHATIC NEGATIVE: 0
CONSTITUTIONAL NEGATIVE: 0
CARDIOVASCULAR NEGATIVE: 0
MUSCULOSKELETAL NEGATIVE: 0
GASTROINTESTINAL NEGATIVE: 0
PSYCHIATRIC NEGATIVE: 0
DEPRESSION: 0
ENDOCRINE NEGATIVE: 0
NEUROLOGICAL NEGATIVE: 0
LOSS OF SENSATION IN FEET: 0
OCCASIONAL FEELINGS OF UNSTEADINESS: 0

## 2024-06-04 ASSESSMENT — REFRACTION_WEARINGRX
SPECS_TYPE: BIFOCAL
OS_CYLINDER: -1.00
OD_AXIS: 087
OS_AXIS: 088
OS_SPHERE: +0.75
OD_SPHERE: +1.00
OD_ADD: +3.00
OS_ADD: +3.00
OD_CYLINDER: -1.50

## 2024-06-04 ASSESSMENT — TONOMETRY
OD_IOP_MMHG: 16
IOP_METHOD: GOLDMANN APPLANATION
OS_IOP_MMHG: 16

## 2024-06-04 ASSESSMENT — CUP TO DISC RATIO
OS_RATIO: 0.3
OD_RATIO: 0.35

## 2024-06-04 ASSESSMENT — PAIN SCALES - GENERAL: PAINLEVEL: 0-NO PAIN

## 2024-06-04 ASSESSMENT — PATIENT HEALTH QUESTIONNAIRE - PHQ9
1. LITTLE INTEREST OR PLEASURE IN DOING THINGS: NOT AT ALL
2. FEELING DOWN, DEPRESSED OR HOPELESS: NOT AT ALL
SUM OF ALL RESPONSES TO PHQ9 QUESTIONS 1 AND 2: 0

## 2024-06-04 ASSESSMENT — EXTERNAL EXAM - RIGHT EYE: OD_EXAM: BROW PTOSIS

## 2024-06-04 ASSESSMENT — SLIT LAMP EXAM - LIDS
COMMENTS: 1+ BLEPHARITIS, 1+ DERMATOCHALASIS - UPPER LID
COMMENTS: 1+ BLEPHARITIS, 1+ DERMATOCHALASIS - UPPER LID

## 2024-06-04 ASSESSMENT — EXTERNAL EXAM - LEFT EYE: OS_EXAM: BROW PTOSIS

## 2024-06-04 NOTE — PROGRESS NOTES
Subjective   Patient ID: Laurie Ozuna is a 83 y.o. female.    Chief Complaint    Annual Exam       HPI    No visual acuity (VA) complaints    Complete and macular exam.  No new changes in health history or meds.  Vision is stable and unchanged.  No new problems or complaints.      Last edited by Ryan Landin MD on 6/4/2024 11:31 AM.        No current outpatient medications on file. (Ophthalmology pharm classes)       Current Outpatient Medications (Other)   Medication Sig Dispense Refill    acetaminophen (Tylenol) 500 mg tablet Take 2 tablets (1,000 mg) by mouth once daily.      amiodarone (Pacerone) 200 mg tablet Take 1 tablet (200 mg) by mouth once daily. 90 tablet 3    apixaban (Eliquis) 5 mg tablet TAKE 1 TABLET BY MOUTH TWICE DAILY 180 tablet 3    atorvastatin (Lipitor) 20 mg tablet Take 1 tablet (20 mg) by mouth once daily. 90 tablet 3    bisacodyl (Dulcolax, bisacodyl,) 10 mg suppository Insert 1 suppository (10 mg) into the rectum once daily. 12 suppository 2    calcium carbonate 600 mg calcium (1,500 mg) tablet Take 1 tablet (1,500 mg) by mouth 2 times a day.      candesartan (Atacand) 32 mg tablet Take 1 tablet (32 mg) by mouth once daily.      cyanocobalamin, vitamin B-12, 5,000 mcg tablet, sublingual Place 5,000 mcg under the tongue once daily.      docusate sodium (Colace) 100 mg capsule Take 1 capsule (100 mg) by mouth 2 times a day as needed.      Eliquis 5 mg tablet Take 1 tablet (5 mg) by mouth 2 times a day. 180 tablet 3    estradiol (Estrace) 0.01 % (0.1 mg/gram) vaginal cream Insert 0.5 Applicatorfuls (2 g) into the vagina once daily. Apply a pea-sized amount to vaginal opening every Monday, Wednesday, and Friday evening      levothyroxine (Synthroid, Levoxyl) 75 mcg tablet Take 1 tablet (75 mcg) by mouth once daily in the morning. Take before meals. 90 tablet 1    lidocaine (Lidoderm) 5 % patch Place 1 patch on the skin once daily. Remove & discard patch within 12 hours or as directed by  MD.      melatonin 10 mg tablet Take 2 tablets (20 mg) by mouth once daily at bedtime.      metoprolol succinate XL (Toprol-XL) 25 mg 24 hr tablet Take 1 tablet (25 mg) by mouth once daily. 90 tablet 3    multivit-iron-minerals-folic acid (Centrum Silver) 0.4 mg-300 mcg- 250 mcg tab Take 1 tablet by mouth once daily.      potassium chloride CR 20 mEq ER tablet TAKE 1 TABLET(20 MEQ) BY MOUTH ONCE DAILY. DO NOT CRUSH OR CHEW 90 tablet 1    torsemide (Demadex) 20 mg tablet Take 1.5 tablets (30 mg) by mouth once daily. 90 tablet 3       Objective   Base Eye Exam       Visual Acuity (Snellen - Single)         Right Left Both    Dist cc 20/30 -1 20/30 +1     Near cc   J1+      Correction: Glasses              Tonometry (Goldmann Applanation, 10:32 AM)         Right Left    Pressure 16 16              Pupils         Dark Shape React APD    Right 4 Round Minimal None    Left 4 Round Minimal None              Extraocular Movement         Right Left     Full Full              Dilation       Both eyes: 1% Tropic 2.5% Phen @ 10:32 AM                  Additional Tests       Keratometry (Automated)         K1 Axis K2 Axis    Right 45.00 95 46.00 5    Left 44.50 95 46.00 5                  Slit Lamp and Fundus Exam       External Exam         Right Left    External Brow ptosis Brow ptosis              Slit Lamp Exam         Right Left    Lids/Lashes 1+ Blepharitis, 1+ Dermatochalasis - upper lid 1+ Blepharitis, 1+ Dermatochalasis - upper lid    Conjunctiva/Sclera normal bulbar and palepbral conjunctiva normal bulbar and palepbral conjunctiva    Cornea normal epi/stroma/endo and tear film normal epi/stroma/endo and tear film    Anterior Chamber ant. chamber deep and quiet ant. chamber deep and quiet    Iris iris normal iris normal    Lens Centered posterior chamber intraocular lens Centered posterior chamber intraocular lens, trace Superior-temporal Posterior capsular opacification    Anterior Vitreous vitreous clear and normal  vitreous clear and normal              Fundus Exam         Right Left    Disc normal optic nerve normal optic nerve    C/D Ratio 0.35 0.3    Macula normal macula normal macula    Vessels normal retinal vessels normal retinal vessels    Periphery normal retinal periphery normal retinal periphery                  Refraction       Wearing Rx         Sphere Cylinder Axis Add    Right +1.00 -1.50 087 +3.00    Left +0.75 -1.00 088 +3.00      Type: Bifocal              Manifest Refraction (Auto)         Sphere Cylinder Axis    Right +1.25 -1.50 090    Left +1.25 -2.00 095      Pupillary Distance: 58              Final Rx         Sphere Cylinder Romeo Dist VA Add Near VA    Right +1.25 -1.50 090 20/25 +3.00 J1+    Left +1.00 -1.25 090 20/20 +3.00 J1+      Type: Bifocal    Expiration Date: 6/4/2026    Pupillary Distance: 58                    Assessment/Plan   Problem List Items Addressed This Visit          Eye/Vision problems    Unspecified disorder of refraction    Artificial lens present    Dermatochalasis of both upper eyelids    Puckering of macula, right eye - Primary     F/u one year full with oct mac.  Unchanged rx.           Relevant Orders    OCT, Retina - OU - Both Eyes (Completed)    Tear film insufficiency

## 2024-06-17 ENCOUNTER — OFFICE VISIT (OUTPATIENT)
Dept: CARDIOLOGY | Facility: CLINIC | Age: 84
End: 2024-06-17
Payer: COMMERCIAL

## 2024-06-17 VITALS
HEIGHT: 63 IN | HEART RATE: 64 BPM | SYSTOLIC BLOOD PRESSURE: 129 MMHG | BODY MASS INDEX: 35.44 KG/M2 | DIASTOLIC BLOOD PRESSURE: 74 MMHG | WEIGHT: 200 LBS | OXYGEN SATURATION: 96 %

## 2024-06-17 DIAGNOSIS — R00.2 PALPITATIONS: ICD-10-CM

## 2024-06-17 DIAGNOSIS — I10 BENIGN ESSENTIAL HYPERTENSION: Primary | ICD-10-CM

## 2024-06-17 DIAGNOSIS — I48.91 ATRIAL FIBRILLATION, UNSPECIFIED TYPE (MULTI): ICD-10-CM

## 2024-06-17 PROCEDURE — 3074F SYST BP LT 130 MM HG: CPT | Performed by: NURSE PRACTITIONER

## 2024-06-17 PROCEDURE — 99214 OFFICE O/P EST MOD 30 MIN: CPT | Performed by: NURSE PRACTITIONER

## 2024-06-17 PROCEDURE — 1160F RVW MEDS BY RX/DR IN RCRD: CPT | Performed by: NURSE PRACTITIONER

## 2024-06-17 PROCEDURE — 93005 ELECTROCARDIOGRAM TRACING: CPT | Performed by: NURSE PRACTITIONER

## 2024-06-17 PROCEDURE — 3078F DIAST BP <80 MM HG: CPT | Performed by: NURSE PRACTITIONER

## 2024-06-17 PROCEDURE — 1159F MED LIST DOCD IN RCRD: CPT | Performed by: NURSE PRACTITIONER

## 2024-06-17 PROCEDURE — 1036F TOBACCO NON-USER: CPT | Performed by: NURSE PRACTITIONER

## 2024-06-17 ASSESSMENT — ENCOUNTER SYMPTOMS
DEPRESSION: 0
CARDIOVASCULAR NEGATIVE: 1
OCCASIONAL FEELINGS OF UNSTEADINESS: 0
RESPIRATORY NEGATIVE: 1
LOSS OF SENSATION IN FEET: 0

## 2024-06-17 ASSESSMENT — LIFESTYLE VARIABLES
AUDIT-C TOTAL SCORE: 0
HOW OFTEN DO YOU HAVE SIX OR MORE DRINKS ON ONE OCCASION: NEVER
HOW MANY STANDARD DRINKS CONTAINING ALCOHOL DO YOU HAVE ON A TYPICAL DAY: PATIENT DOES NOT DRINK
HOW OFTEN DO YOU HAVE A DRINK CONTAINING ALCOHOL: NEVER
SKIP TO QUESTIONS 9-10: 1

## 2024-06-17 NOTE — PROGRESS NOTES
Subjective   Laurie KIMBERLYN Ozuna is a 83 y.o. female.    Chief Complaint:  Follow-up (2 MONTH FUV)    HPI  Mrs. Ozuna is seen for a follow up.  She is seen in collaboration with Dr. Johnson.  She has seen Dr. Valenzuela in the interim and had her pacemaker adjusted.  She is feeling a little less tired and less short of breath.  She remains complaint with medication and appears to tolerate them well.  She is active at her home and for her 83 years of age.  She has no new symptoms.  She does not need any refills today.  She has had no recent hospitalizations or ED visits.       Review of Systems   Cardiovascular: Negative.    Respiratory: Negative.     All other systems reviewed and are negative.      Objective    Appearance: Healthy appearance. Not in distress.   Eyes:      Pupils: Pupils are equal, round, and reactive to light.   Neck:      Vascular: No JVR.  No JVD  Pulmonary:      Effort: Pulmonary effort is normal.      Breath sounds: Normal breath sounds. No wheezing. No rhonchi. No rales.   Chest:      Chest wall: Not tender to palpitation.   Cardiovascular:      Normal rate. Regular rhythm. Normal S1. Normal S2.       Murmurs: There is no murmur.      No gallop.  No click. No rub.   Edema:     Trace ankle peripheral edema   Abdominal:      Tenderness: There is no abdominal tenderness.   Musculoskeletal: Normal range of motion.         General: No tenderness.      Cervical back: Normal range of motion.   Skin:     General: Skin is warm and dry.   Neurological:      General: No focal deficit present.      Mental Status: Alert and oriented to person, place and time.   Psychiatric:         Mood and Affect: Mood normal.         Behavior: Behavior is cooperative.       Lab Review:   Lab Results   Component Value Date     05/15/2024    K 4.8 05/15/2024     05/15/2024    CO2 28 05/15/2024    BUN 31 (H) 05/15/2024    CREATININE 1.24 (H) 05/15/2024    GLUCOSE 94 05/15/2024    CALCIUM 9.0 05/15/2024     Lab  Results   Component Value Date    WBC 4.3 (L) 05/15/2024    HGB 11.9 (L) 05/15/2024    HCT 38.6 05/15/2024    MCV 95 05/15/2024     05/15/2024     Lab Results   Component Value Date    CHOL 135 05/15/2024    CHOL 135 05/15/2024    TRIG 56 05/15/2024    HDL 61.5 05/15/2024    HDL 61.5 05/15/2024     ECG obtained and reviewed shows an atrial paced rhythm with VR of 60 bpm      Assessment/Plan   Mrs Ozuna is a pleasant 83 year old female with a medical history significant for AVNRT ablation in 2006, atrial fibrillation with multiple DCCV on Amiodarone since 2018, permanent pacemaker placement 2018, mild to moderate MR and moderate PTHN by echo 10/2023 with preserved LV and RV function, hypertension and hyperlipidemia. Nuclear stress test 5/2016 showed no ischemia.  She presents feeling well with some fatigue today but in general feels improved with an increase in her atrial output. VS and ECG are stable.  She is active at home limited by arthritis.  She is euthyroid with a FLP at goal.  She will continue all medication unchanged and will followup in 6 months seeing EP in the interim with device interrogation scheduled as well.  She knows to call for any concerns or questions prior to her followup.

## 2024-06-20 LAB
ATRIAL RATE: 61 BPM
PR INTERVAL: 332 MS
Q ONSET: 224 MS
QRS COUNT: 10 BEATS
QRS DURATION: 96 MS
QT INTERVAL: 430 MS
QTC CALCULATION(BAZETT): 430 MS
QTC FREDERICIA: 430 MS
R AXIS: -14 DEGREES
T AXIS: 35 DEGREES
T OFFSET: 439 MS
VENTRICULAR RATE: 60 BPM

## 2024-08-19 ENCOUNTER — HOSPITAL ENCOUNTER (OUTPATIENT)
Dept: CARDIOLOGY | Facility: CLINIC | Age: 84
Discharge: HOME | End: 2024-08-19
Payer: COMMERCIAL

## 2024-08-19 DIAGNOSIS — Z95.0 PRESENCE OF CARDIAC PACEMAKER: ICD-10-CM

## 2024-08-19 DIAGNOSIS — I49.5 SICK SINUS SYNDROME (MULTI): ICD-10-CM

## 2024-08-19 PROCEDURE — 93296 REM INTERROG EVL PM/IDS: CPT

## 2024-09-05 ENCOUNTER — TELEPHONE (OUTPATIENT)
Dept: CARDIOLOGY | Facility: HOSPITAL | Age: 84
End: 2024-09-05
Payer: COMMERCIAL

## 2024-09-06 DIAGNOSIS — R60.9 EDEMA, UNSPECIFIED TYPE: Primary | ICD-10-CM

## 2024-09-06 NOTE — TELEPHONE ENCOUNTER
I spoke to patient. I will have her take an extra 1/2 tablet of torsemide (so two tablets daily) today and tomorrow. She will also try to limit her salt and fluid intake. I will have her obtain repeat blood work on Monday to ensure her kidney function and electrolytes remain stable. She will call if her weight and swelling does not improve.

## 2024-09-09 ENCOUNTER — LAB (OUTPATIENT)
Dept: LAB | Facility: LAB | Age: 84
End: 2024-09-09
Payer: COMMERCIAL

## 2024-09-09 ENCOUNTER — TELEPHONE (OUTPATIENT)
Dept: CARDIOLOGY | Facility: CLINIC | Age: 84
End: 2024-09-09

## 2024-09-09 DIAGNOSIS — R60.9 EDEMA, UNSPECIFIED TYPE: ICD-10-CM

## 2024-09-09 DIAGNOSIS — I48.0 PAROXYSMAL ATRIAL FIBRILLATION (MULTI): ICD-10-CM

## 2024-09-09 LAB
ANION GAP SERPL CALC-SCNC: 14 MMOL/L (ref 10–20)
BUN SERPL-MCNC: 22 MG/DL (ref 6–23)
CALCIUM SERPL-MCNC: 9.2 MG/DL (ref 8.6–10.6)
CHLORIDE SERPL-SCNC: 104 MMOL/L (ref 98–107)
CO2 SERPL-SCNC: 31 MMOL/L (ref 21–32)
CREAT SERPL-MCNC: 1.33 MG/DL (ref 0.5–1.05)
EGFRCR SERPLBLD CKD-EPI 2021: 39 ML/MIN/1.73M*2
GLUCOSE SERPL-MCNC: 102 MG/DL (ref 74–99)
MAGNESIUM SERPL-MCNC: 2.59 MG/DL (ref 1.6–2.4)
POTASSIUM SERPL-SCNC: 4.5 MMOL/L (ref 3.5–5.3)
SODIUM SERPL-SCNC: 144 MMOL/L (ref 136–145)

## 2024-09-09 PROCEDURE — 83735 ASSAY OF MAGNESIUM: CPT

## 2024-09-09 PROCEDURE — 36415 COLL VENOUS BLD VENIPUNCTURE: CPT

## 2024-09-09 PROCEDURE — 80048 BASIC METABOLIC PNL TOTAL CA: CPT

## 2024-09-09 RX ORDER — TORSEMIDE 20 MG/1
TABLET ORAL
Start: 2024-09-09

## 2024-09-09 NOTE — TELEPHONE ENCOUNTER
I spoke to patient. Her blood work remains stable. She has lost about 4 lbs, though still has some swelling. Her breathing has improved. She was previously taking torsemide 1.5 tablets daily, with an additional 1/2 tablet about twice/week. In order to keep her euvolemic, I will have her take 2 tablets M, W, F, and 1.5 tablets the other days of the week. She will follow up with us in December as previously scheduled. She knows to call for any concerns.

## 2024-10-16 ENCOUNTER — HOSPITAL ENCOUNTER (OUTPATIENT)
Dept: CARDIOLOGY | Facility: CLINIC | Age: 84
Discharge: HOME | End: 2024-10-16
Payer: COMMERCIAL

## 2024-10-16 ENCOUNTER — APPOINTMENT (OUTPATIENT)
Dept: CARDIOLOGY | Facility: CLINIC | Age: 84
End: 2024-10-16
Payer: COMMERCIAL

## 2024-10-16 VITALS
DIASTOLIC BLOOD PRESSURE: 54 MMHG | HEIGHT: 63 IN | OXYGEN SATURATION: 96 % | HEART RATE: 75 BPM | WEIGHT: 199.44 LBS | BODY MASS INDEX: 35.34 KG/M2 | SYSTOLIC BLOOD PRESSURE: 127 MMHG

## 2024-10-16 DIAGNOSIS — Z79.899 LONG TERM CURRENT USE OF AMIODARONE: Primary | ICD-10-CM

## 2024-10-16 DIAGNOSIS — Z95.0 PRESENCE OF CARDIAC PACEMAKER: ICD-10-CM

## 2024-10-16 DIAGNOSIS — I49.5 SINUS BRADY-TACHY SYNDROME (MULTI): ICD-10-CM

## 2024-10-16 DIAGNOSIS — N28.1 RENAL CYST: ICD-10-CM

## 2024-10-16 DIAGNOSIS — I49.5 SICK SINUS SYNDROME (MULTI): ICD-10-CM

## 2024-10-16 DIAGNOSIS — I48.0 PAROXYSMAL ATRIAL FIBRILLATION (MULTI): ICD-10-CM

## 2024-10-16 LAB — BODY SURFACE AREA: 2.01 M2

## 2024-10-16 PROCEDURE — 93280 PM DEVICE PROGR EVAL DUAL: CPT

## 2024-10-16 PROCEDURE — 3074F SYST BP LT 130 MM HG: CPT | Performed by: INTERNAL MEDICINE

## 2024-10-16 PROCEDURE — 99214 OFFICE O/P EST MOD 30 MIN: CPT | Performed by: INTERNAL MEDICINE

## 2024-10-16 PROCEDURE — 1159F MED LIST DOCD IN RCRD: CPT | Performed by: INTERNAL MEDICINE

## 2024-10-16 PROCEDURE — 3078F DIAST BP <80 MM HG: CPT | Performed by: INTERNAL MEDICINE

## 2024-10-16 PROCEDURE — 93005 ELECTROCARDIOGRAM TRACING: CPT | Performed by: INTERNAL MEDICINE

## 2024-10-16 RX ORDER — LIDOCAINE 4 G/100G
PATCH TOPICAL
COMMUNITY
Start: 2024-10-05

## 2024-10-16 ASSESSMENT — CHA2DS2 SCORE
HYPERTENSION: YES
SEX: FEMALE
CHF OR LEFT VENTRICULAR DYSFUNCTION: NO
PRIOR STROKE OR TIA OR THROMBOEMBOLISM: NO
AGE IN YEARS: 75+
DIABETES: NO
CHA2D2S VASC SCORE: 4
VASCULAR DISEASE: NO

## 2024-10-16 ASSESSMENT — ENCOUNTER SYMPTOMS
LOSS OF SENSATION IN FEET: 0
DEPRESSION: 0
OCCASIONAL FEELINGS OF UNSTEADINESS: 1

## 2024-10-16 NOTE — PROGRESS NOTES
Returns for follow up visit for    Chief Complaint   Patient presents with    Follow-up    Atrial Fibrillation     Patient returns to clinic with continued appreciation of shortness of breath with moderate activity  (stairs, vacuuming). Patient denies palpitations, lightheadedness, syncope, orthopnea and chest pain/discomfort.  YOAV Felder RN         History Of Present Illness:    Laurie Ozuna is a 84 y.o. year old female patient   83 yo with history of AVNRT ablation in 2006. She was initially treated with metoprolol but had profound bradycardia. Since then she did well but always had some shorter palpitations recently this has increased in frequency. She underwent recent ziopatch and echo. The monitor showed short episodes of atrial tachycardia from  bpm. She had only short offset pauses.      She is having back pain so has cut her walking down. She is scheduled for injections.   She did experience more shortness of breath over the last year and has been taking additional diuretic with good results.  She does note at times palpitations but not sure if these are AF events.     Past Medical History:  Past Medical History:   Diagnosis Date    Anisometropia     Dry eye syndrome of bilateral lacrimal glands     Other conditions influencing health status     DEXA Body Composition Study    Other long term (current) drug therapy 07/07/2020    Chronic prescription benzodiazepine use    Personal history of other specified conditions 03/15/2018    History of diarrhea    Personal history of other specified conditions 10/26/2017    History of palpitations    Personal history of other specified conditions 10/21/2015    History of fatigue    Presence of intraocular lens     Puckering of macula, right eye     Unspecified disorder of refraction        Past Surgical History:  Past Surgical History:   Procedure Laterality Date    APPENDECTOMY  01/05/2015    Appendectomy    CHOLECYSTECTOMY  01/05/2015    Cholecystectomy     COLONOSCOPY  07/03/2013    Complete Colonoscopy    OTHER SURGICAL HISTORY  07/25/2018    Lithotomy          Family History:  Family History   Problem Relation Name Age of Onset    Other (typhus) Mother      Coronary artery disease Father      Hyperthyroidism Sister      Coronary artery disease Brother          Allergies:  Allergies   Allergen Reactions    Lisinopril Cough        Outpatient Medications:  Current Outpatient Medications   Medication Instructions    acetaminophen (Tylenol) 500 mg tablet 2 tablets, Daily    amiodarone (PACERONE) 200 mg, oral, Daily    atorvastatin (LIPITOR) 20 mg, oral, Daily    bisacodyl (DULCOLAX (BISACODYL)) 10 mg, rectal, Daily    calcium carbonate 600 mg calcium (1,500 mg) tablet 1 tablet, 2 times daily    candesartan (ATACAND) 32 mg, Daily    cyanocobalamin (vitamin B-12) 5,000 mcg, Daily RT    docusate sodium (Colace) 100 mg capsule 1 capsule, 2 times daily PRN    Eliquis 5 mg, oral, 2 times daily    Eliquis 5 mg, oral, 2 times daily    estradiol (ESTRACE) 2 g, Daily    levothyroxine (SYNTHROID, LEVOXYL) 75 mcg, oral, Daily before breakfast    Lidocaine Pain Relief 4 % patch APPLY 1 PATCH AS DIRECTED ONCE DAILY FOR 5 DAYS. REMOVE PATCH AFTER 12 HOURS    melatonin 10 mg tablet 2 tablets, Nightly    metoprolol succinate XL (TOPROL-XL) 25 mg, oral, Daily    multivit-iron-minerals-folic acid (Centrum Silver) 0.4 mg-300 mcg- 250 mcg tab 1 tablet, Daily    potassium chloride CR 20 mEq ER tablet TAKE 1 TABLET(20 MEQ) BY MOUTH ONCE DAILY. DO NOT CRUSH OR CHEW    torsemide (Demadex) 20 mg tablet Take 2 tablets on M,W,F and 1.5 tablets the other days of the week         Last Recorded Vitals:      2/5/2024     3:27 PM 4/15/2024     1:51 PM 4/15/2024     2:48 PM 4/17/2024     3:58 PM 5/14/2024    11:52 AM 6/17/2024     1:08 PM 10/16/2024     3:48 PM   Vitals   Systolic 145 153 134 144 128 129 127   Diastolic 75 75 70 71 77 74 54   Heart Rate 60 71  60 59 64 75   Resp    18      Height (in)   "1.6 m (5' 3\")  1.6 m (5' 3\") 1.6 m (5' 3\") 1.6 m (5' 3\") 1.6 m (5' 3\")   Weight (lb)  197.6  197 198 200 199.44   BMI  35 kg/m2  34.9 kg/m2 35.07 kg/m2 35.43 kg/m2 35.33 kg/m2   BSA (m2)  2 m2  1.99 m2 2 m2 2.01 m2 2.01 m2   Visit Report Report Report Report Report Report Report Report        Physical Exam:  Physical Exam  Constitutional:       Appearance: Normal appearance. She is normal weight.   HENT:      Head: Normocephalic.      Nose: Nose normal.   Neck:      Vascular: No carotid bruit.   Cardiovascular:      Rate and Rhythm: Normal rate and regular rhythm.      Heart sounds: Normal heart sounds.   Pulmonary:      Breath sounds: Normal breath sounds.   Musculoskeletal:         General: Normal range of motion.      Right lower leg: No edema.      Left lower leg: No edema.   Skin:     General: Skin is warm and dry.   Neurological:      General: No focal deficit present.      Mental Status: She is alert and oriented to person, place, and time.   Psychiatric:         Mood and Affect: Mood normal.         Behavior: Behavior normal.          Last Cardiology Tests:  ECG:  Encounter Date: 06/17/24   ECG 12 lead (Clinic Performed)   Result Value    Ventricular Rate 60    Atrial Rate 61    MD Interval 332    QRS Duration 96    QT Interval 430    QTC Calculation(Bazett) 430    R Axis -14    T Axis 35    QRS Count 10    Q Onset 224    T Offset 439    QTC Fredericia 430    Narrative    Atrial-paced rhythm with prolonged AV conduction  Nonspecific ST and T wave abnormality  Abnormal ECG  Confirmed by Juan Johnson ([id number]) on 6/26/2024 7:24:44 AM      Today ECG atrial fibrillation V pacing 64 bpm    Echo:  Transthoracic Echo (TTE) Complete    Result Date: 10/21/2023   [facility], [address] [address]              Tel [phone] and Fax [phone] TRANSTHORACIC ECHOCARDIOGRAM REPORT  Patient Name:     THUAN BARKLEY       Reading Physician:   Phuong Gaines                          "                                   MD Study Date:       10/18/2023          Ordering Provider:   [id number] BECKY LAO MRN/PID:          [id number]            Fellow: Accession#:       [id number]        Nurse: Date of           1940 / 83      Sonographer:         Kirill Tarango RDCS Birth/Age:        years Gender:           F                   Additional Staff: Height:           160.02 cm           Admit Date: Weight:           83.91 kg            Admission Status:    Outpatient BSA:              1.87 m2             Encounter#:          [id number]                                       Department Location: [facility] Blood Pressure: 126 /74 mmHg Study Type:    TRANSTHORACIC ECHO (TTE) COMPLETE Diagnosis/ICD: Nonrheumatic mitral (valve) insufficiency-I34.0 Indication:    Mitral valve insufficiency CPT Code:      Echo Complete w Full Doppler-[id number] Patient History: Pertinent History: A-fib; chest pain; dyspnea; HTN; palpitations; dizziness;                    fatigue; SVT s/p AVNRT ablation. Study Detail: The following Echo studies were performed: 2D, M-Mode, Doppler and               color flow. Technically challenging study due to body habitus.  PHYSICIAN INTERPRETATION: Left Ventricle: The left ventricular systolic function is low normal, with an estimated ejection fraction of 55%. The calculated ejection fraction is normal at 57 % using the Samano's Bi-plane MOD calculation. There are no regional wall motion abnormalities. The left ventricular cavity size is upper limits of normal. Abnormal (paradoxical) septal motion, consistent with RV pacemaker. Spectral Doppler shows a normal pattern of left ventricular diastolic filling. Left Atrium: The left atrium is mild to moderately dilated. Right Ventricle: The right ventricle is normal in size. There is normal right ventriclar wall thickness. There is normal right ventricular global systolic function. A device is visualized in the right ventricle. Right Atrium:  The right atrium is mild to moderately dilated. There is a device visualized in the right atrium. Aortic Valve: The aortic valve appears structurally normal. The aortic valve appears tricuspid and non-restricted. There is no evidence of aortic valve regurgitation. The peak instantaneous gradient of the aortic valve is 3.6 mmHg. Mitral Valve: The mitral valve is normal in structure. There is normal mitral valve leaflet mobility. There is mild to moderate mitral valve regurgitation which is centrally directed. Tricuspid Valve: The tricuspid valve is structurally normal. There is normal tricuspid valve leaflet mobility. There is moderate to severe tricuspid regurgitation. The Doppler estimated RVSP is moderate to severely elevated at 60.9 mmHg. Pulmonic Valve: The pulmonic valve is structurally normal. There is mild to moderate pulmonic valve regurgitation. Pericardium: There is no pericardial effusion noted. Aorta: The aortic root is normal. The Ao Sinus is 3.20 cm. The Asc Ao is 3.20 cm. There is no dilatation of the aortic arch. There is no dilatation of the ascending aorta. There is no dilatation of the aortic root. Pulmonary Artery: The pulmonary artery is normal in size. The tricuspid regurgitant velocity is 3.64 m/s, and with an estimated right atrial pressure of 8 mmHg, the estimated pulmonary artery pressure is moderate to severely elevated with the RVSP at 60.9 mmHg. The estimated PASP is 60 mmHg. Systemic Veins: The inferior vena cava appears mildly dilated.  CONCLUSIONS:  1. Left ventricular systolic function is low normal with a 55% estimated ejection fraction.  2. Abnormal septal motion consistent with RV pacemaker.  3. The left atrium is mild to moderately dilated.  4. The right atrium is mild to moderately dilated.  5. Mild to moderate mitral valve regurgitation.  6. Moderate to severe tricuspid regurgitation visualized.  7. Moderate to severely elevated right ventricular systolic pressure.  8. Moderate  to severely elevated pulmonary artery pressure.  9. The estimated PASP is 60 mmHg. QUANTITATIVE DATA SUMMARY: 2D MEASUREMENTS:                          Normal Ranges: Ao Root d:     3.20 cm   (2.0-3.7cm) LAs:           4.04 cm   (2.7-4.0cm) IVSd:          1.09 cm   (0.6-1.1cm) LVPWd:         0.62 cm   (0.6-1.1cm) LVIDd:         4.99 cm   (3.9-5.9cm) LVIDs:         3.40 cm LV Mass Index: 79.2 g/m2 LV % FS        31.9 % LA VOLUME:                              Normal Ranges: LA Vol A4C:       77.3 ml    (22+/-6mL/m2) LA Vol A2C:       71.1 ml LA Vol BP:        78.3 ml LA Vol Index A4C: 41.3ml/m2 LA Vol Index A2C: 38.0 ml/m2 LA Vol Index BP:  41.9 ml/m2 LA Volume Index:  41.9 ml/m2 LA Vol A4C:       69.8 ml LA Vol A2C:       69.1 ml RA VOLUME BY A/L METHOD:                       Normal Ranges: RA Area A4C: 20.0 cm2 AORTA MEASUREMENTS:                      Normal Ranges: Ao Sinus, d: 3.20 cm (2.1-3.5cm) Asc Ao, d:   3.20 cm (2.1-3.4cm) LV SYSTOLIC FUNCTION BY 2D PLANIMETRY (MOD):                     Normal Ranges: EF-A4C View: 58.1 % (>=55%) EF-A2C View: 54.3 % EF-Biplane:  56.6 % LV DIASTOLIC FUNCTION:                     Normal Ranges: MV Peak E: 0.89 m/s (0.7-1.2 m/s) MV DT:     190 msec (150-240 msec) MITRAL VALVE:                 Normal Ranges: MV DT: 190 msec (150-240msec) AORTIC VALVE:                         Normal Ranges: AoV Vmax:      0.94 m/s (<=1.7m/s) AoV Peak PG:   3.6 mmHg (<20mmHg) LVOT Max Nnamdi:  0.70 m/s (<=1.1m/s) LVOT VTI:      17.15 cm LVOT Diameter: 2.17 cm  (1.8-2.4cm) AoV Area,Vmax: 2.73 cm2 (2.5-4.5cm2)  RIGHT VENTRICLE: RV Basal 3.50 cm RV Mid   2.70 cm RV Major 6.2 cm TAPSE:   19.0 mm RV s'    0.10 m/s TRICUSPID VALVE/RVSP:                             Normal Ranges: Peak TR Velocity: 3.64 m/s RV Syst Pressure: 60.9 mmHg (< 30mmHg) IVC Diam:         2.00 cm PULMONIC VALVE:                         Normal Ranges: PV Accel Time: 57 msec  (>120ms) PV Max Nnamdi:    0.8 m/s  (0.6-0.9m/s) PV Max PG:      2.4 mmHg  [id number] Romeo Gaines MD Electronically signed on 10/21/2023 at 8:56:34 PM  ** Final **         Pacemaker check today:    Arrhythmia burden is 7.3%  but this is likely underestimate due to afib undersensing.  Sensitivity adjusted to 0.1mV.    RV pacing 75%  With appropriate modeswitch this should decrease  - her intrinsic conduction is narrow.  2.4 years remaining on battery    Lab review: I have Chemistry CMP:   Lab Results   Component Value Date    ALBUMIN 4.0 05/15/2024    CALCIUM 9.2 09/09/2024    CO2 31 09/09/2024    CREATININE 1.33 (H) 09/09/2024    GLUCOSE 102 (H) 09/09/2024    BILITOT 0.8 05/15/2024    PROT 6.9 05/15/2024    ALT 16 05/15/2024    AST 22 05/15/2024    ALKPHOS 99 05/15/2024   , Chemistry BMP   Lab Results   Component Value Date    GLUCOSE 102 (H) 09/09/2024    CALCIUM 9.2 09/09/2024    CO2 31 09/09/2024    CREATININE 1.33 (H) 09/09/2024   , and CBC:  Lab Results   Component Value Date    WBC 4.3 (L) 05/15/2024    RBC 4.08 05/15/2024    HGB 11.9 (L) 05/15/2024    HCT 38.6 05/15/2024    MCV 95 05/15/2024    MCH 29.2 05/15/2024    MCHC 30.8 (L) 05/15/2024    RDW 13.9 05/15/2024    NRBC 0.0 05/15/2024       Assessment/Plan   Problem List Items Addressed This Visit             ICD-10-CM    Renal cyst N28.1    Atrial fibrillation (Multi) I48.91    Relevant Orders    Comprehensive metabolic panel    Sinus michael-tachy syndrome (Multi) I49.5    Relevant Orders    ECG 12 lead (Clinic Performed)    TSH with reflex to Free T4 if abnormal     Other Visit Diagnoses         Codes    Long term current use of amiodarone    -  Primary Z79.899    Relevant Orders    Comprehensive metabolic panel    TSH with reflex to Free T4 if abnormal        Fanny Cameron CNP in 6 monhs.  Recheck labs  Check pacemaker remote in 3 months.     Kristie Valenzuela MD

## 2024-10-16 NOTE — PATIENT INSTRUCTIONS
Chief Complaint: lower back   Date of Injury: 2019  Initial Treatment Date: 2019   Date Last Seen: 2019  Mechanism of Onset: suddenly reaching and bending  Occupation: retired  Referred by:Robert Kerns DC  Primary Care Physician: Damon Lyn MD  Date informed consent signed:  2018   Jordan  reports that he has never smoked. He has quit using smokeless tobacco. His smokeless tobacco use included Chew.  Jordan is allergic to sulfa antibiotics.  Denies allergy to latex or sensitivity to latex.  Advance Directive Status:none   Visit Number of Current Episode: 1  Discharged from care: No  Initial pain ratin-3/10     Relevant Diagnostic Imaging/Testing:   None in chart   There were not vitals taken for this visit.  No changes in patient's medical or social history since their last visit.    SUBJECTIVE:  Jordan is a pleasant 64 year old male that presents to our office today for an evaluation and treatment of lower back muscle spasms. He rates his pain today at 2-3/10 with 10 being the worst. David was doing pretty good until last Wednesday he was bending/ reaching to open a  the bottom of his fridge and suddenly had a sever muscle spasm in his lower back. Since then he has been having constant tightness/stiffness and its been gradually increasing throughout the week. He is trying to avoid any activity that involves bending, twisting, and lifting. He does have to do all his daily activities with caution. Taking Flexeril, tylenol, and using Biofreeze for pain relief.                It was nice to see you today!    The liver and thyroid testing was normal so you can continue your current amiodarone dosage.  We reprogrammed the pacemaker to better detect atrial fibrillation episodes- in case you are having more of it than we think.    Your next remote will be in 3 months.  Please schedule a follow up with Fanny Cameron CNP for amiodarone follow up in 6 months.   Have your repeat blood work done after November 15, 2025.

## 2024-10-18 LAB
ATRIAL RATE: 234 BPM
Q ONSET: 209 MS
QRS COUNT: 10 BEATS
QRS DURATION: 98 MS
QT INTERVAL: 422 MS
QTC CALCULATION(BAZETT): 435 MS
QTC FREDERICIA: 431 MS
R AXIS: -29 DEGREES
T AXIS: 15 DEGREES
T OFFSET: 420 MS
VENTRICULAR RATE: 64 BPM

## 2024-10-22 ENCOUNTER — APPOINTMENT (OUTPATIENT)
Dept: ORTHOPEDIC SURGERY | Facility: CLINIC | Age: 84
End: 2024-10-22
Payer: COMMERCIAL

## 2024-10-24 ENCOUNTER — APPOINTMENT (OUTPATIENT)
Dept: CARDIOLOGY | Facility: CLINIC | Age: 84
End: 2024-10-24
Payer: COMMERCIAL

## 2024-10-24 ENCOUNTER — TELEPHONE (OUTPATIENT)
Dept: CARDIOLOGY | Facility: CLINIC | Age: 84
End: 2024-10-24

## 2024-10-24 DIAGNOSIS — E78.00 HYPERCHOLESTEREMIA: ICD-10-CM

## 2024-10-24 RX ORDER — ATORVASTATIN CALCIUM 20 MG/1
20 TABLET, FILM COATED ORAL DAILY
Qty: 90 TABLET | Refills: 3 | Status: SHIPPED | OUTPATIENT
Start: 2024-10-24 | End: 2025-10-24

## 2024-11-04 ENCOUNTER — TELEPHONE (OUTPATIENT)
Dept: CARDIOLOGY | Facility: CLINIC | Age: 84
End: 2024-11-04
Payer: COMMERCIAL

## 2024-11-04 DIAGNOSIS — I48.0 PAROXYSMAL ATRIAL FIBRILLATION (MULTI): ICD-10-CM

## 2024-11-04 RX ORDER — TORSEMIDE 20 MG/1
TABLET ORAL
Qty: 180 TABLET | Refills: 1 | Status: SHIPPED
Start: 2024-11-04 | End: 2024-11-10

## 2024-11-07 ENCOUNTER — APPOINTMENT (OUTPATIENT)
Dept: RADIOLOGY | Facility: HOSPITAL | Age: 84
End: 2024-11-07
Payer: MEDICARE

## 2024-11-07 ENCOUNTER — HOSPITAL ENCOUNTER (INPATIENT)
Facility: HOSPITAL | Age: 84
LOS: 3 days | End: 2024-11-10
Attending: EMERGENCY MEDICINE | Admitting: PSYCHIATRY & NEUROLOGY
Payer: MEDICARE

## 2024-11-07 ENCOUNTER — CLINICAL SUPPORT (OUTPATIENT)
Dept: EMERGENCY MEDICINE | Facility: HOSPITAL | Age: 84
End: 2024-11-07
Payer: MEDICARE

## 2024-11-07 ENCOUNTER — EXTERNAL HOSPITAL ADMISSION (OUTPATIENT)
Dept: NEUROLOGY | Facility: HOSPITAL | Age: 84
End: 2024-11-07
Payer: MEDICARE

## 2024-11-07 DIAGNOSIS — I63.512 ACUTE ISCHEMIC LEFT MCA STROKE (MULTI): Primary | ICD-10-CM
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
## 2024-11-07 LAB
ABO GROUP (TYPE) IN BLOOD: NORMAL
ALBUMIN SERPL BCP-MCNC: 4.4 G/DL (ref 3.4–5)
ALP SERPL-CCNC: 131 U/L (ref 33–136)
ALT SERPL W P-5'-P-CCNC: 24 U/L (ref 7–45)
ANION GAP BLDV CALCULATED.4IONS-SCNC: 10 MMOL/L (ref 10–25)
ANION GAP SERPL CALC-SCNC: 15 MMOL/L (ref 10–20)
ANTIBODY SCREEN: NORMAL
APPEARANCE UR: CLEAR
AST SERPL W P-5'-P-CCNC: 26 U/L (ref 9–39)
BASE EXCESS BLDV CALC-SCNC: 4.6 MMOL/L (ref -2–3)
BASOPHILS # BLD AUTO: 0.01 X10*3/UL (ref 0–0.1)
BASOPHILS NFR BLD AUTO: 0.1 %
BILIRUB SERPL-MCNC: 0.7 MG/DL (ref 0–1.2)
BILIRUB UR STRIP.AUTO-MCNC: NEGATIVE MG/DL
BODY TEMPERATURE: 37 DEGREES CELSIUS
BUN SERPL-MCNC: 27 MG/DL (ref 6–23)
CA-I BLDV-SCNC: 1.01 MMOL/L (ref 1.1–1.33)
CALCIUM SERPL-MCNC: 9.4 MG/DL (ref 8.6–10.6)
CARDIAC TROPONIN I PNL SERPL HS: 8 NG/L (ref 0–34)
CHLORIDE BLDV-SCNC: 104 MMOL/L (ref 98–107)
CHLORIDE SERPL-SCNC: 100 MMOL/L (ref 98–107)
CHOLEST SERPL-MCNC: 145 MG/DL (ref 0–199)
CHOLESTEROL/HDL RATIO: 2.3
CO2 SERPL-SCNC: 30 MMOL/L (ref 21–32)
COLOR UR: NORMAL
CREAT SERPL-MCNC: 1.08 MG/DL (ref 0.5–1.05)
EGFRCR SERPLBLD CKD-EPI 2021: 50 ML/MIN/1.73M*2
EOSINOPHIL # BLD AUTO: 0 X10*3/UL (ref 0–0.4)
EOSINOPHIL NFR BLD AUTO: 0 %
ERYTHROCYTE [DISTWIDTH] IN BLOOD BY AUTOMATED COUNT: 15.3 % (ref 11.5–14.5)
ETHANOL SERPL-MCNC: <10 MG/DL
GLUCOSE BLD MANUAL STRIP-MCNC: 182 MG/DL (ref 74–99)
GLUCOSE BLDV-MCNC: 142 MG/DL (ref 74–99)
GLUCOSE SERPL-MCNC: 129 MG/DL (ref 74–99)
GLUCOSE UR STRIP.AUTO-MCNC: NORMAL MG/DL
HCO3 BLDV-SCNC: 28.3 MMOL/L (ref 22–26)
HCT VFR BLD AUTO: 40.6 % (ref 36–46)
HCT VFR BLD EST: 38 % (ref 36–46)
HDLC SERPL-MCNC: 63.6 MG/DL
HGB BLD-MCNC: 12.4 G/DL (ref 12–16)
HGB BLDV-MCNC: 12.8 G/DL (ref 12–16)
IMM GRANULOCYTES # BLD AUTO: 0.07 X10*3/UL (ref 0–0.5)
IMM GRANULOCYTES NFR BLD AUTO: 0.6 % (ref 0–0.9)
INR PPP: 1.2 (ref 0.9–1.1)
KETONES UR STRIP.AUTO-MCNC: NEGATIVE MG/DL
LACTATE BLDV-SCNC: 1.3 MMOL/L (ref 0.4–2)
LACTATE SERPL-SCNC: 1.3 MMOL/L (ref 0.4–2)
LDLC SERPL CALC-MCNC: 65 MG/DL
LEUKOCYTE ESTERASE UR QL STRIP.AUTO: NEGATIVE
LYMPHOCYTES # BLD AUTO: 1.67 X10*3/UL (ref 0.8–3)
LYMPHOCYTES NFR BLD AUTO: 14.2 %
MAGNESIUM SERPL-MCNC: 2.53 MG/DL (ref 1.6–2.4)
MCH RBC QN AUTO: 27.3 PG (ref 26–34)
MCHC RBC AUTO-ENTMCNC: 30.5 G/DL (ref 32–36)
MCV RBC AUTO: 89 FL (ref 80–100)
MONOCYTES # BLD AUTO: 0.92 X10*3/UL (ref 0.05–0.8)
MONOCYTES NFR BLD AUTO: 7.8 %
NEUTROPHILS # BLD AUTO: 9.11 X10*3/UL (ref 1.6–5.5)
NEUTROPHILS NFR BLD AUTO: 77.3 %
NITRITE UR QL STRIP.AUTO: NEGATIVE
NON HDL CHOLESTEROL: 81 MG/DL (ref 0–149)
NRBC BLD-RTO: 0 /100 WBCS (ref 0–0)
OXYHGB MFR BLDV: 91.6 % (ref 45–75)
PCO2 BLDV: 38 MM HG (ref 41–51)
PH BLDV: 7.48 PH (ref 7.33–7.43)
PH UR STRIP.AUTO: 5.5 [PH]
PLATELET # BLD AUTO: 175 X10*3/UL (ref 150–450)
PO2 BLDV: 64 MM HG (ref 35–45)
POTASSIUM BLDV-SCNC: 7.1 MMOL/L (ref 3.5–5.3)
POTASSIUM SERPL-SCNC: 4.2 MMOL/L (ref 3.5–5.3)
PROT SERPL-MCNC: 7.7 G/DL (ref 6.4–8.2)
PROT UR STRIP.AUTO-MCNC: NEGATIVE MG/DL
PROTHROMBIN TIME: 13.9 SECONDS (ref 9.8–12.8)
RBC # BLD AUTO: 4.55 X10*6/UL (ref 4–5.2)
RBC # UR STRIP.AUTO: NEGATIVE /UL
RH FACTOR (ANTIGEN D): NORMAL
SAO2 % BLDV: 94 % (ref 45–75)
SODIUM BLDV-SCNC: 135 MMOL/L (ref 136–145)
SODIUM SERPL-SCNC: 141 MMOL/L (ref 136–145)
SP GR UR STRIP.AUTO: 1.03
TRIGL SERPL-MCNC: 82 MG/DL (ref 0–149)
UROBILINOGEN UR STRIP.AUTO-MCNC: NORMAL MG/DL
VLDL: 16 MG/DL (ref 0–40)
WBC # BLD AUTO: 11.8 X10*3/UL (ref 4.4–11.3)

## 2024-11-07 PROCEDURE — 94002 VENT MGMT INPAT INIT DAY: CPT

## 2024-11-07 PROCEDURE — 72128 CT CHEST SPINE W/O DYE: CPT | Mod: RCN

## 2024-11-07 PROCEDURE — 85610 PROTHROMBIN TIME: CPT | Performed by: EMERGENCY MEDICINE

## 2024-11-07 PROCEDURE — 2550000001 HC RX 255 CONTRASTS: Performed by: EMERGENCY MEDICINE

## 2024-11-07 PROCEDURE — 76377 3D RENDER W/INTRP POSTPROCES: CPT

## 2024-11-07 PROCEDURE — 51702 INSERT TEMP BLADDER CATH: CPT

## 2024-11-07 PROCEDURE — 72131 CT LUMBAR SPINE W/O DYE: CPT | Mod: RCN

## 2024-11-07 PROCEDURE — 71045 X-RAY EXAM CHEST 1 VIEW: CPT

## 2024-11-07 PROCEDURE — 03CG3ZZ EXTIRPATION OF MATTER FROM INTRACRANIAL ARTERY, PERCUTANEOUS APPROACH: ICD-10-PCS | Performed by: STUDENT IN AN ORGANIZED HEALTH CARE EDUCATION/TRAINING PROGRAM

## 2024-11-07 PROCEDURE — 82077 ASSAY SPEC XCP UR&BREATH IA: CPT | Performed by: EMERGENCY MEDICINE

## 2024-11-07 PROCEDURE — 86901 BLOOD TYPING SEROLOGIC RH(D): CPT | Performed by: EMERGENCY MEDICINE

## 2024-11-07 PROCEDURE — 76937 US GUIDE VASCULAR ACCESS: CPT | Performed by: STUDENT IN AN ORGANIZED HEALTH CARE EDUCATION/TRAINING PROGRAM

## 2024-11-07 PROCEDURE — 2500000004 HC RX 250 GENERAL PHARMACY W/ HCPCS (ALT 636 FOR OP/ED)

## 2024-11-07 PROCEDURE — 80061 LIPID PANEL: CPT | Performed by: STUDENT IN AN ORGANIZED HEALTH CARE EDUCATION/TRAINING PROGRAM

## 2024-11-07 PROCEDURE — 99291 CRITICAL CARE FIRST HOUR: CPT

## 2024-11-07 PROCEDURE — C1769 GUIDE WIRE: HCPCS | Performed by: STUDENT IN AN ORGANIZED HEALTH CARE EDUCATION/TRAINING PROGRAM

## 2024-11-07 PROCEDURE — 96375 TX/PRO/DX INJ NEW DRUG ADDON: CPT | Mod: 59

## 2024-11-07 PROCEDURE — C1887 CATHETER, GUIDING: HCPCS | Performed by: STUDENT IN AN ORGANIZED HEALTH CARE EDUCATION/TRAINING PROGRAM

## 2024-11-07 PROCEDURE — 31500 INSERT EMERGENCY AIRWAY: CPT

## 2024-11-07 PROCEDURE — 99223 1ST HOSP IP/OBS HIGH 75: CPT

## 2024-11-07 PROCEDURE — 96374 THER/PROPH/DIAG INJ IV PUSH: CPT | Mod: 59

## 2024-11-07 PROCEDURE — 99222 1ST HOSP IP/OBS MODERATE 55: CPT | Performed by: PHYSICIAN ASSISTANT

## 2024-11-07 PROCEDURE — 71045 X-RAY EXAM CHEST 1 VIEW: CPT | Performed by: RADIOLOGY

## 2024-11-07 PROCEDURE — 36224 PLACE CATH CAROTD ART: CPT | Performed by: STUDENT IN AN ORGANIZED HEALTH CARE EDUCATION/TRAINING PROGRAM

## 2024-11-07 PROCEDURE — 2500000005 HC RX 250 GENERAL PHARMACY W/O HCPCS: Performed by: EMERGENCY MEDICINE

## 2024-11-07 PROCEDURE — 83605 ASSAY OF LACTIC ACID: CPT | Performed by: EMERGENCY MEDICINE

## 2024-11-07 PROCEDURE — 61645 PERQ ART M-THROMBECT &/NFS: CPT | Performed by: STUDENT IN AN ORGANIZED HEALTH CARE EDUCATION/TRAINING PROGRAM

## 2024-11-07 PROCEDURE — 31500 INSERT EMERGENCY AIRWAY: CPT | Performed by: EMERGENCY MEDICINE

## 2024-11-07 PROCEDURE — C1760 CLOSURE DEV, VASC: HCPCS | Performed by: STUDENT IN AN ORGANIZED HEALTH CARE EDUCATION/TRAINING PROGRAM

## 2024-11-07 PROCEDURE — 70498 CT ANGIOGRAPHY NECK: CPT

## 2024-11-07 PROCEDURE — 2020000001 HC ICU ROOM DAILY

## 2024-11-07 PROCEDURE — 85025 COMPLETE CBC W/AUTO DIFF WBC: CPT | Performed by: EMERGENCY MEDICINE

## 2024-11-07 PROCEDURE — 74177 CT ABD & PELVIS W/CONTRAST: CPT

## 2024-11-07 PROCEDURE — 36906 THRMBC/NFS DIALYSIS CIRCUIT: CPT | Performed by: STUDENT IN AN ORGANIZED HEALTH CARE EDUCATION/TRAINING PROGRAM

## 2024-11-07 PROCEDURE — 2500000004 HC RX 250 GENERAL PHARMACY W/ HCPCS (ALT 636 FOR OP/ED): Performed by: EMERGENCY MEDICINE

## 2024-11-07 PROCEDURE — 36228 PLACE CATH INTRACRANIAL ART: CPT | Performed by: STUDENT IN AN ORGANIZED HEALTH CARE EDUCATION/TRAINING PROGRAM

## 2024-11-07 PROCEDURE — 99291 CRITICAL CARE FIRST HOUR: CPT | Performed by: EMERGENCY MEDICINE

## 2024-11-07 PROCEDURE — 2780000003 HC OR 278 NO HCPCS: Performed by: STUDENT IN AN ORGANIZED HEALTH CARE EDUCATION/TRAINING PROGRAM

## 2024-11-07 PROCEDURE — 84132 ASSAY OF SERUM POTASSIUM: CPT | Performed by: EMERGENCY MEDICINE

## 2024-11-07 PROCEDURE — 72125 CT NECK SPINE W/O DYE: CPT

## 2024-11-07 PROCEDURE — G0390 TRAUMA RESPONS W/HOSP CRITI: HCPCS

## 2024-11-07 PROCEDURE — 83880 ASSAY OF NATRIURETIC PEPTIDE: CPT | Performed by: STUDENT IN AN ORGANIZED HEALTH CARE EDUCATION/TRAINING PROGRAM

## 2024-11-07 PROCEDURE — 82947 ASSAY GLUCOSE BLOOD QUANT: CPT

## 2024-11-07 PROCEDURE — 74018 RADEX ABDOMEN 1 VIEW: CPT

## 2024-11-07 PROCEDURE — 81003 URINALYSIS AUTO W/O SCOPE: CPT | Performed by: EMERGENCY MEDICINE

## 2024-11-07 PROCEDURE — 76376 3D RENDER W/INTRP POSTPROCES: CPT | Performed by: STUDENT IN AN ORGANIZED HEALTH CARE EDUCATION/TRAINING PROGRAM

## 2024-11-07 PROCEDURE — 36415 COLL VENOUS BLD VENIPUNCTURE: CPT | Performed by: EMERGENCY MEDICINE

## 2024-11-07 PROCEDURE — 99152 MOD SED SAME PHYS/QHP 5/>YRS: CPT | Performed by: STUDENT IN AN ORGANIZED HEALTH CARE EDUCATION/TRAINING PROGRAM

## 2024-11-07 PROCEDURE — 84132 ASSAY OF SERUM POTASSIUM: CPT

## 2024-11-07 PROCEDURE — 93005 ELECTROCARDIOGRAM TRACING: CPT

## 2024-11-07 PROCEDURE — 70486 CT MAXILLOFACIAL W/O DYE: CPT

## 2024-11-07 PROCEDURE — 74018 RADEX ABDOMEN 1 VIEW: CPT | Performed by: RADIOLOGY

## 2024-11-07 PROCEDURE — 84484 ASSAY OF TROPONIN QUANT: CPT | Performed by: EMERGENCY MEDICINE

## 2024-11-07 PROCEDURE — B31R1ZZ FLUOROSCOPY OF INTRACRANIAL ARTERIES USING LOW OSMOLAR CONTRAST: ICD-10-PCS | Performed by: STUDENT IN AN ORGANIZED HEALTH CARE EDUCATION/TRAINING PROGRAM

## 2024-11-07 PROCEDURE — 83036 HEMOGLOBIN GLYCOSYLATED A1C: CPT | Performed by: STUDENT IN AN ORGANIZED HEALTH CARE EDUCATION/TRAINING PROGRAM

## 2024-11-07 PROCEDURE — 93010 ELECTROCARDIOGRAM REPORT: CPT | Performed by: EMERGENCY MEDICINE

## 2024-11-07 PROCEDURE — 75710 ARTERY X-RAYS ARM/LEG: CPT | Mod: RT | Performed by: STUDENT IN AN ORGANIZED HEALTH CARE EDUCATION/TRAINING PROGRAM

## 2024-11-07 PROCEDURE — 99153 MOD SED SAME PHYS/QHP EA: CPT | Performed by: STUDENT IN AN ORGANIZED HEALTH CARE EDUCATION/TRAINING PROGRAM

## 2024-11-07 PROCEDURE — 70450 CT HEAD/BRAIN W/O DYE: CPT

## 2024-11-07 PROCEDURE — C1876 STENT, NON-COA/NON-COV W/DEL: HCPCS | Performed by: STUDENT IN AN ORGANIZED HEALTH CARE EDUCATION/TRAINING PROGRAM

## 2024-11-07 PROCEDURE — 2720000007 HC OR 272 NO HCPCS: Performed by: STUDENT IN AN ORGANIZED HEALTH CARE EDUCATION/TRAINING PROGRAM

## 2024-11-07 PROCEDURE — 83735 ASSAY OF MAGNESIUM: CPT | Performed by: EMERGENCY MEDICINE

## 2024-11-07 RX ORDER — ETOMIDATE 2 MG/ML
INJECTION INTRAVENOUS
Status: DISPENSED
Start: 2024-11-07 | End: 2024-11-08

## 2024-11-07 RX ORDER — FENTANYL CITRATE 50 UG/ML
INJECTION, SOLUTION INTRAMUSCULAR; INTRAVENOUS
Status: DISPENSED
Start: 2024-11-07 | End: 2024-11-08

## 2024-11-07 RX ORDER — POLYETHYLENE GLYCOL 3350 17 G/17G
17 POWDER, FOR SOLUTION ORAL DAILY
Status: DISCONTINUED | OUTPATIENT
Start: 2024-11-08 | End: 2024-11-09

## 2024-11-07 RX ORDER — HYDRALAZINE HYDROCHLORIDE 20 MG/ML
5 INJECTION INTRAMUSCULAR; INTRAVENOUS ONCE
Status: COMPLETED | OUTPATIENT
Start: 2024-11-07 | End: 2024-11-07

## 2024-11-07 RX ORDER — DEXTROSE 50 % IN WATER (D50W) INTRAVENOUS SYRINGE
12.5
Status: DISCONTINUED | OUTPATIENT
Start: 2024-11-07 | End: 2024-11-09

## 2024-11-07 RX ORDER — LABETALOL HYDROCHLORIDE 5 MG/ML
20 INJECTION, SOLUTION INTRAVENOUS ONCE
Status: DISCONTINUED | OUTPATIENT
Start: 2024-11-07 | End: 2024-11-07

## 2024-11-07 RX ORDER — LABETALOL HYDROCHLORIDE 5 MG/ML
10 INJECTION, SOLUTION INTRAVENOUS EVERY 10 MIN PRN
Status: DISCONTINUED | OUTPATIENT
Start: 2024-11-07 | End: 2024-11-09

## 2024-11-07 RX ORDER — METOPROLOL SUCCINATE 25 MG/1
25 TABLET, EXTENDED RELEASE ORAL DAILY
Status: DISCONTINUED | OUTPATIENT
Start: 2024-11-08 | End: 2024-11-09

## 2024-11-07 RX ORDER — PROPOFOL 10 MG/ML
INJECTION, EMULSION INTRAVENOUS
Status: COMPLETED
Start: 2024-11-07 | End: 2024-11-07

## 2024-11-07 RX ORDER — DEXMEDETOMIDINE HYDROCHLORIDE 4 UG/ML
.1-1.5 INJECTION, SOLUTION INTRAVENOUS CONTINUOUS
Status: DISCONTINUED | OUTPATIENT
Start: 2024-11-07 | End: 2024-11-09

## 2024-11-07 RX ORDER — AMIODARONE HYDROCHLORIDE 200 MG/1
200 TABLET ORAL DAILY
Status: DISCONTINUED | OUTPATIENT
Start: 2024-11-08 | End: 2024-11-09

## 2024-11-07 RX ORDER — DEXTROSE 50 % IN WATER (D50W) INTRAVENOUS SYRINGE
25
Status: DISCONTINUED | OUTPATIENT
Start: 2024-11-07 | End: 2024-11-09

## 2024-11-07 RX ORDER — ROCURONIUM BROMIDE 10 MG/ML
INJECTION, SOLUTION INTRAVENOUS
Status: DISPENSED
Start: 2024-11-07 | End: 2024-11-08

## 2024-11-07 RX ORDER — PANTOPRAZOLE SODIUM 40 MG/1
40 TABLET, DELAYED RELEASE ORAL
Status: DISCONTINUED | OUTPATIENT
Start: 2024-11-08 | End: 2024-11-09

## 2024-11-07 RX ORDER — FENTANYL CITRATE 50 UG/ML
INJECTION, SOLUTION INTRAMUSCULAR; INTRAVENOUS CODE/TRAUMA/SEDATION MEDICATION
Status: COMPLETED | OUTPATIENT
Start: 2024-11-07 | End: 2024-11-07

## 2024-11-07 RX ORDER — ETOMIDATE 2 MG/ML
INJECTION INTRAVENOUS CODE/TRAUMA/SEDATION MEDICATION
Status: COMPLETED | OUTPATIENT
Start: 2024-11-07 | End: 2024-11-07

## 2024-11-07 RX ORDER — LEVOTHYROXINE SODIUM 75 UG/1
75 TABLET ORAL DAILY
Status: DISCONTINUED | OUTPATIENT
Start: 2024-11-08 | End: 2024-11-09

## 2024-11-07 RX ORDER — PROPOFOL 10 MG/ML
0-50 INJECTION, EMULSION INTRAVENOUS CONTINUOUS
Status: DISCONTINUED | OUTPATIENT
Start: 2024-11-07 | End: 2024-11-07

## 2024-11-07 RX ORDER — PANTOPRAZOLE SODIUM 40 MG/10ML
40 INJECTION, POWDER, LYOPHILIZED, FOR SOLUTION INTRAVENOUS
Status: DISCONTINUED | OUTPATIENT
Start: 2024-11-08 | End: 2024-11-09

## 2024-11-07 RX ORDER — ROCURONIUM BROMIDE 10 MG/ML
INJECTION, SOLUTION INTRAVENOUS CODE/TRAUMA/SEDATION MEDICATION
Status: COMPLETED | OUTPATIENT
Start: 2024-11-07 | End: 2024-11-07

## 2024-11-07 RX ORDER — HYDRALAZINE HYDROCHLORIDE 50 MG/1
25 TABLET, FILM COATED ORAL EVERY 6 HOURS PRN
Status: DISCONTINUED | OUTPATIENT
Start: 2024-11-09 | End: 2024-11-09

## 2024-11-07 RX ORDER — ATORVASTATIN CALCIUM 40 MG/1
40 TABLET, FILM COATED ORAL NIGHTLY
Status: DISCONTINUED | OUTPATIENT
Start: 2024-11-07 | End: 2024-11-09

## 2024-11-07 RX ORDER — HYDRALAZINE HYDROCHLORIDE 20 MG/ML
10 INJECTION INTRAMUSCULAR; INTRAVENOUS
Status: DISCONTINUED | OUTPATIENT
Start: 2024-11-07 | End: 2024-11-09

## 2024-11-07 RX ORDER — CALCIUM GLUCONATE 20 MG/ML
2 INJECTION, SOLUTION INTRAVENOUS ONCE
Status: DISCONTINUED | OUTPATIENT
Start: 2024-11-07 | End: 2024-11-07

## 2024-11-07 SDOH — ECONOMIC STABILITY: HOUSING INSECURITY
IN THE LAST 12 MONTHS, WAS THERE A TIME WHEN YOU WERE NOT ABLE TO PAY THE MORTGAGE OR RENT ON TIME?: PATIENT UNABLE TO ANSWER

## 2024-11-07 SDOH — ECONOMIC STABILITY: INCOME INSECURITY
IN THE PAST 12 MONTHS HAS THE ELECTRIC, GAS, OIL, OR WATER COMPANY THREATENED TO SHUT OFF SERVICES IN YOUR HOME?: PATIENT UNABLE TO ANSWER

## 2024-11-07 SDOH — ECONOMIC STABILITY: FOOD INSECURITY
WITHIN THE PAST 12 MONTHS, YOU WORRIED THAT YOUR FOOD WOULD RUN OUT BEFORE YOU GOT THE MONEY TO BUY MORE.: PATIENT UNABLE TO ANSWER

## 2024-11-07 SDOH — SOCIAL STABILITY: SOCIAL INSECURITY: WITHIN THE LAST YEAR, HAVE YOU BEEN AFRAID OF YOUR PARTNER OR EX-PARTNER?: PATIENT UNABLE TO ANSWER

## 2024-11-07 SDOH — SOCIAL STABILITY: SOCIAL INSECURITY
WITHIN THE LAST YEAR, HAVE YOU BEEN RAPED OR FORCED TO HAVE ANY KIND OF SEXUAL ACTIVITY BY YOUR PARTNER OR EX-PARTNER?: PATIENT UNABLE TO ANSWER

## 2024-11-07 SDOH — ECONOMIC STABILITY: TRANSPORTATION INSECURITY
IN THE PAST 12 MONTHS, HAS LACK OF TRANSPORTATION KEPT YOU FROM MEDICAL APPOINTMENTS OR FROM GETTING MEDICATIONS?: PATIENT UNABLE TO ANSWER

## 2024-11-07 SDOH — SOCIAL STABILITY: SOCIAL INSECURITY
WITHIN THE LAST YEAR, HAVE YOU BEEN HUMILIATED OR EMOTIONALLY ABUSED IN OTHER WAYS BY YOUR PARTNER OR EX-PARTNER?: PATIENT UNABLE TO ANSWER

## 2024-11-07 SDOH — ECONOMIC STABILITY: HOUSING INSECURITY: IN THE PAST 12 MONTHS, HOW MANY TIMES HAVE YOU MOVED WHERE YOU WERE LIVING?: 0

## 2024-11-07 SDOH — ECONOMIC STABILITY: HOUSING INSECURITY: AT ANY TIME IN THE PAST 12 MONTHS, WERE YOU HOMELESS OR LIVING IN A SHELTER (INCLUDING NOW)?: PATIENT UNABLE TO ANSWER

## 2024-11-07 SDOH — SOCIAL STABILITY: SOCIAL INSECURITY
WITHIN THE LAST YEAR, HAVE YOU BEEN KICKED, HIT, SLAPPED, OR OTHERWISE PHYSICALLY HURT BY YOUR PARTNER OR EX-PARTNER?: PATIENT UNABLE TO ANSWER

## 2024-11-07 SDOH — ECONOMIC STABILITY: FOOD INSECURITY
WITHIN THE PAST 12 MONTHS, THE FOOD YOU BOUGHT JUST DIDN'T LAST AND YOU DIDN'T HAVE MONEY TO GET MORE.: PATIENT UNABLE TO ANSWER

## 2024-11-07 SDOH — ECONOMIC STABILITY: FOOD INSECURITY
HOW HARD IS IT FOR YOU TO PAY FOR THE VERY BASICS LIKE FOOD, HOUSING, MEDICAL CARE, AND HEATING?: PATIENT UNABLE TO ANSWER

## 2024-11-07 SDOH — SOCIAL STABILITY: SOCIAL INSECURITY: WERE YOU ABLE TO COMPLETE ALL THE BEHAVIORAL HEALTH SCREENINGS?: NO

## 2024-11-07 ASSESSMENT — LIFESTYLE VARIABLES
EVER HAD A DRINK FIRST THING IN THE MORNING TO STEADY YOUR NERVES TO GET RID OF A HANGOVER: NO
HAVE YOU EVER FELT YOU SHOULD CUT DOWN ON YOUR DRINKING: NO
HAVE PEOPLE ANNOYED YOU BY CRITICIZING YOUR DRINKING: NO
TOTAL SCORE: 0
EVER FELT BAD OR GUILTY ABOUT YOUR DRINKING: NO

## 2024-11-07 ASSESSMENT — ACTIVITIES OF DAILY LIVING (ADL)
ADEQUATE_TO_COMPLETE_ADL: UNABLE TO ASSESS
FEEDING YOURSELF: UNABLE TO ASSESS
LACK_OF_TRANSPORTATION: PATIENT UNABLE TO ANSWER
DRESSING YOURSELF: UNABLE TO ASSESS
WALKS IN HOME: UNABLE TO ASSESS
GROOMING: UNABLE TO ASSESS
PATIENT'S MEMORY ADEQUATE TO SAFELY COMPLETE DAILY ACTIVITIES?: UNABLE TO ASSESS
TOILETING: UNABLE TO ASSESS
JUDGMENT_ADEQUATE_SAFELY_COMPLETE_DAILY_ACTIVITIES: UNABLE TO ASSESS
HEARING - LEFT EAR: UNABLE TO ASSESS
LACK_OF_TRANSPORTATION: PATIENT UNABLE TO ANSWER
BATHING: UNABLE TO ASSESS
HEARING - RIGHT EAR: UNABLE TO ASSESS

## 2024-11-07 ASSESSMENT — PAIN SCALES - GENERAL
PAINLEVEL_OUTOF10: 0 - NO PAIN
PAINLEVEL_OUTOF10: 5 - MODERATE PAIN
PAINLEVEL_OUTOF10: 0 - NO PAIN
PAINLEVEL_OUTOF10: 0 - NO PAIN

## 2024-11-07 ASSESSMENT — PAIN - FUNCTIONAL ASSESSMENT
PAIN_FUNCTIONAL_ASSESSMENT: CPOT (CRITICAL CARE PAIN OBSERVATION TOOL)
PAIN_FUNCTIONAL_ASSESSMENT: 0-10

## 2024-11-07 ASSESSMENT — COGNITIVE AND FUNCTIONAL STATUS - GENERAL: PATIENT BASELINE BEDBOUND: UNABLE TO ASSESS AT THIS TIME

## 2024-11-07 NOTE — ED PROVIDER NOTES
Emergency Department Provider Note        History of Present Illness     Limitations to History: Patient's altered mental status     HPI:  Patient is an 84-year-old female with a past medical history of A-fib status post pacemaker on Eliquis, hypertension presenting to the ED via EMS as a full trauma activation after a reported fall. The patient presented to the ED with a GCS of 10, altered and not responding to questions.  Patient was not protecting her airway and therefore was intubated for airway protection. Additional history is unable to be obtained at this time due to patient's critical condition and no family present upon patient's arrival to the ED.    Physical Exam   Triage vitals:  T 36.1 °C (96.9 °F)  HR 82  /77  RR 16  O2 96 % None (Room air)    Physical Exam  Constitutional:       Appearance: She is ill-appearing.      Comments: Eyes are open spontaneously but patient does not respond to questions.    HENT:      Head: Normocephalic.      Comments: Right frontal scalp cephalohematoma.      Mouth/Throat:      Mouth: Mucous membranes are moist.      Comments: Some secretions at the mouth.  Eyes:      General: No scleral icterus.     Pupils: Pupils are equal, round, and reactive to light.   Neck:      Comments: In c-collar  Cardiovascular:      Rate and Rhythm: Normal rate and regular rhythm.      Pulses: Normal pulses.   Pulmonary:      Comments: Some tachypnea and coarse breath sounds on the left.   Abdominal:      General: There is no distension.      Palpations: Abdomen is soft.      Tenderness: There is no abdominal tenderness. There is no guarding or rebound.   Skin:     General: Skin is warm.      Findings: Bruising present.      Comments: Some bruising to the dorsum of right hand which appears to be from prior IV placement.   Neurological:      Comments: GCS 10 (E4, V2, M4). Patient is aphasic.  Moves all extremities spontaneously but appears to have weaker withdrawal in LLE compared to  YANETH.           Medical Decision Making & ED Course   Medical Decision Making:  Patient is an 84-year-old female on reported Eliquis who presented to the ED via EMS as a full trauma activation after reported fall. Patient was evaluated by the ED team and Trauma team upon arrival to the ED. IV access was obtained. Patient presented to the ED with a GCS of 10 with cephalohematoma on the right forehead. Patient was not protecting her airway with secretions at the mouth and therefore was intubated for airway protection. Patient was premedicated with IV fentanyl prior to intubation due to concern for possible ICH and and received IV etomidate, rocuronium for the intubation. Afterwards, she was started on IV propofol for sedation.  After her airway was secured, the patient was brought to the CT scanner for CT pan scan as well as CT angio head/neck. While in the CT scanner imaging shows concerns for a left KAYLEY/MCA stroke for which BAT was called. CT also complete occlusion of the proximal left MCA M1 segment without distal reconstitution or opacification of distal branches. There was complete occlusion of the proximal left KAYLEY with minimal faint reconstitution of far distal branches. CT pan scan showed no other traumatic injuries. Neurostroke evaluated the patient in the ED and report that they plan to take the patient to thrombectomy.  Per collateral information obtained later on from family, they said that a family member spoke with the patient at 3:50 PM and the conversation was normal. Then a family member returned home at 5:30 pm and found the patient on the floor. Per discussion with Neurology team, the patient was given a dose of IV hydralazine to treat her elevated BP. CBC showed white count of 11.8. Hemoglobin is 12.4. INR is 1.2. CMP showed creatinine of 1.08. Troponin was negative. Urinalysis showed no evidence of any UTI. Patient was taken directly to thrombectomy from the ED and will be admitted afterwards to  the NSU under Neurology service.   ----        EKG Independent Interpretation: Sinus rhythm, heart rate 69, QTc 452, no STEMI        The patient was discussed with the following consultants/services: Neurostroke, trauma surgery      Diagnoses as of 11/08/24 1511   Acute ischemic left MCA stroke (Multi)   Fall, initial encounter          Disposition   As a result of their workup, the patient will require admission to the hospital.      Procedures   Procedures    Patient seen and discussed with ED attending physician.    Miguelina Emmanuel MD  Emergency Medicine       Miguelina Emmanuel MD  Resident  11/08/24 1516    I saw and evaluated the patient. I personally obtained the key and critical portions of the history and physical exam or was physically present for key and critical portions performed by the resident/fellow. I reviewed the resident/fellow's documentation and discussed the patient with the resident/fellow. I agree with the resident/fellow's medical decision making as documented in the note.    MD Marielos Peace MD  11/08/24 [id number]

## 2024-11-07 NOTE — ED TRIAGE NOTES
Pt presented from home for witnessed fall from standing. GCS 4 on EMS arrival (10 on hospital arrival). Pt does take eliquis for afib, positive head strike.

## 2024-11-07 NOTE — Clinical Note
Mechanical thrombectomy complete. Access via right groin. Closure via 8 fr angioseal. Hemostasis achieved. 2x2 and tegaderm dressing applied. Dressing clean, dry, and intact. No hematoma. Pt to keep right leg straight for 3 hours post deployment time. VSS. Patient intubated and sedated.  Pt transferred to NSU with Rt and Md, report given to NSU RN.     In room-2010  Time out-2015  Needle to skin-2025  Access-2030  Device #1 up-2110  Dev ice #1 down-2115  Revasc time-2116  Groin closure- 2130  TICI- 2B

## 2024-11-08 ENCOUNTER — APPOINTMENT (OUTPATIENT)
Dept: RADIOLOGY | Facility: HOSPITAL | Age: 84
End: 2024-11-08
Payer: MEDICARE

## 2024-11-08 ENCOUNTER — APPOINTMENT (OUTPATIENT)
Dept: CARDIOLOGY | Facility: HOSPITAL | Age: 84
End: 2024-11-08
Payer: MEDICARE

## 2024-11-08 LAB
ALBUMIN SERPL BCP-MCNC: 3.8 G/DL (ref 3.4–5)
ANION GAP SERPL CALC-SCNC: 17 MMOL/L (ref 10–20)
AORTIC VALVE PEAK VELOCITY: 1.36 M/S
AV PEAK GRADIENT: 7 MMHG
AVA (PEAK VEL): 2 CM2
BASOPHILS # BLD AUTO: 0.02 X10*3/UL (ref 0–0.1)
BASOPHILS NFR BLD AUTO: 0.1 %
BNP SERPL-MCNC: 155 PG/ML (ref 0–99)
BUN SERPL-MCNC: 24 MG/DL (ref 6–23)
CA-I BLD-SCNC: 1 MMOL/L (ref 1.1–1.33)
CALCIUM SERPL-MCNC: 8.6 MG/DL (ref 8.6–10.6)
CHLORIDE SERPL-SCNC: 100 MMOL/L (ref 98–107)
CO2 SERPL-SCNC: 28 MMOL/L (ref 21–32)
CREAT SERPL-MCNC: 1 MG/DL (ref 0.5–1.05)
EGFRCR SERPLBLD CKD-EPI 2021: 55 ML/MIN/1.73M*2
EJECTION FRACTION APICAL 4 CHAMBER: 58.8
EJECTION FRACTION: 60 %
EOSINOPHIL # BLD AUTO: 0.02 X10*3/UL (ref 0–0.4)
EOSINOPHIL NFR BLD AUTO: 0.1 %
ERYTHROCYTE [DISTWIDTH] IN BLOOD BY AUTOMATED COUNT: 15.2 % (ref 11.5–14.5)
EST. AVERAGE GLUCOSE BLD GHB EST-MCNC: 137 MG/DL
GLUCOSE BLD MANUAL STRIP-MCNC: 133 MG/DL (ref 74–99)
GLUCOSE BLD MANUAL STRIP-MCNC: 143 MG/DL (ref 74–99)
GLUCOSE BLD MANUAL STRIP-MCNC: 149 MG/DL (ref 74–99)
GLUCOSE BLD MANUAL STRIP-MCNC: 153 MG/DL (ref 74–99)
GLUCOSE BLD MANUAL STRIP-MCNC: 157 MG/DL (ref 74–99)
GLUCOSE BLD MANUAL STRIP-MCNC: 169 MG/DL (ref 74–99)
GLUCOSE SERPL-MCNC: 172 MG/DL (ref 74–99)
HBA1C MFR BLD: 6.4 %
HCT VFR BLD AUTO: 37 % (ref 36–46)
HGB BLD-MCNC: 11.7 G/DL (ref 12–16)
HOLD SPECIMEN: NORMAL
IMM GRANULOCYTES # BLD AUTO: 0.11 X10*3/UL (ref 0–0.5)
IMM GRANULOCYTES NFR BLD AUTO: 0.6 % (ref 0–0.9)
LEFT ATRIUM VOLUME AREA LENGTH INDEX BSA: 26.5 ML/M2
LEFT VENTRICLE INTERNAL DIMENSION DIASTOLE: 4.7 CM (ref 3.5–6)
LEFT VENTRICULAR OUTFLOW TRACT DIAMETER: 1.9 CM
LYMPHOCYTES # BLD AUTO: 0.69 X10*3/UL (ref 0.8–3)
LYMPHOCYTES NFR BLD AUTO: 4.1 %
MAGNESIUM SERPL-MCNC: 2.28 MG/DL (ref 1.6–2.4)
MCH RBC QN AUTO: 26.6 PG (ref 26–34)
MCHC RBC AUTO-ENTMCNC: 31.6 G/DL (ref 32–36)
MCV RBC AUTO: 84 FL (ref 80–100)
MONOCYTES # BLD AUTO: 1.93 X10*3/UL (ref 0.05–0.8)
MONOCYTES NFR BLD AUTO: 11.4 %
NEUTROPHILS # BLD AUTO: 14.18 X10*3/UL (ref 1.6–5.5)
NEUTROPHILS NFR BLD AUTO: 83.7 %
NRBC BLD-RTO: 0 /100 WBCS (ref 0–0)
PHOSPHATE SERPL-MCNC: 4.7 MG/DL (ref 2.5–4.9)
PLATELET # BLD AUTO: 219 X10*3/UL (ref 150–450)
POTASSIUM SERPL-SCNC: 4.4 MMOL/L (ref 3.5–5.3)
RBC # BLD AUTO: 4.4 X10*6/UL (ref 4–5.2)
RIGHT VENTRICLE FREE WALL PEAK S': 10.7 CM/S
RIGHT VENTRICLE PEAK SYSTOLIC PRESSURE: 51 MMHG
SODIUM SERPL-SCNC: 141 MMOL/L (ref 136–145)
TRICUSPID ANNULAR PLANE SYSTOLIC EXCURSION: 1.2 CM
WBC # BLD AUTO: 17 X10*3/UL (ref 4.4–11.3)

## 2024-11-08 PROCEDURE — C8929 TTE W OR WO FOL WCON,DOPPLER: HCPCS

## 2024-11-08 PROCEDURE — 2020000001 HC ICU ROOM DAILY

## 2024-11-08 PROCEDURE — 2500000004 HC RX 250 GENERAL PHARMACY W/ HCPCS (ALT 636 FOR OP/ED)

## 2024-11-08 PROCEDURE — 2500000002 HC RX 250 W HCPCS SELF ADMINISTERED DRUGS (ALT 637 FOR MEDICARE OP, ALT 636 FOR OP/ED)

## 2024-11-08 PROCEDURE — 94799 UNLISTED PULMONARY SVC/PX: CPT

## 2024-11-08 PROCEDURE — 94003 VENT MGMT INPAT SUBQ DAY: CPT

## 2024-11-08 PROCEDURE — 36415 COLL VENOUS BLD VENIPUNCTURE: CPT

## 2024-11-08 PROCEDURE — 99291 CRITICAL CARE FIRST HOUR: CPT

## 2024-11-08 PROCEDURE — 70450 CT HEAD/BRAIN W/O DYE: CPT

## 2024-11-08 PROCEDURE — 2500000005 HC RX 250 GENERAL PHARMACY W/O HCPCS: Performed by: PSYCHIATRY & NEUROLOGY

## 2024-11-08 PROCEDURE — 85025 COMPLETE CBC W/AUTO DIFF WBC: CPT

## 2024-11-08 PROCEDURE — 36415 COLL VENOUS BLD VENIPUNCTURE: CPT | Performed by: REGISTERED NURSE

## 2024-11-08 PROCEDURE — 83735 ASSAY OF MAGNESIUM: CPT

## 2024-11-08 PROCEDURE — 2500000004 HC RX 250 GENERAL PHARMACY W/ HCPCS (ALT 636 FOR OP/ED): Mod: JZ

## 2024-11-08 PROCEDURE — 2500000005 HC RX 250 GENERAL PHARMACY W/O HCPCS: Performed by: STUDENT IN AN ORGANIZED HEALTH CARE EDUCATION/TRAINING PROGRAM

## 2024-11-08 PROCEDURE — 80069 RENAL FUNCTION PANEL: CPT

## 2024-11-08 PROCEDURE — 70450 CT HEAD/BRAIN W/O DYE: CPT | Performed by: RADIOLOGY

## 2024-11-08 PROCEDURE — 93306 TTE W/DOPPLER COMPLETE: CPT | Performed by: INTERNAL MEDICINE

## 2024-11-08 PROCEDURE — 82330 ASSAY OF CALCIUM: CPT

## 2024-11-08 PROCEDURE — 2500000001 HC RX 250 WO HCPCS SELF ADMINISTERED DRUGS (ALT 637 FOR MEDICARE OP)

## 2024-11-08 PROCEDURE — 36224 PLACE CATH CAROTD ART: CPT | Performed by: STUDENT IN AN ORGANIZED HEALTH CARE EDUCATION/TRAINING PROGRAM

## 2024-11-08 PROCEDURE — 80069 RENAL FUNCTION PANEL: CPT | Performed by: REGISTERED NURSE

## 2024-11-08 PROCEDURE — 94640 AIRWAY INHALATION TREATMENT: CPT

## 2024-11-08 PROCEDURE — 82947 ASSAY GLUCOSE BLOOD QUANT: CPT

## 2024-11-08 RX ORDER — SODIUM CHLORIDE FOR INHALATION 3 %
3 VIAL, NEBULIZER (ML) INHALATION EVERY 6 HOURS SCHEDULED
Status: DISCONTINUED | OUTPATIENT
Start: 2024-11-08 | End: 2024-11-09

## 2024-11-08 RX ORDER — CALCIUM GLUCONATE 20 MG/ML
2 INJECTION, SOLUTION INTRAVENOUS EVERY 6 HOURS PRN
Status: DISCONTINUED | OUTPATIENT
Start: 2024-11-08 | End: 2024-11-09

## 2024-11-08 RX ORDER — MAGNESIUM SULFATE HEPTAHYDRATE 40 MG/ML
4 INJECTION, SOLUTION INTRAVENOUS EVERY 6 HOURS PRN
Status: DISCONTINUED | OUTPATIENT
Start: 2024-11-08 | End: 2024-11-09

## 2024-11-08 RX ORDER — POTASSIUM CHLORIDE 1.5 G/1.58G
20 POWDER, FOR SOLUTION ORAL EVERY 6 HOURS PRN
Status: DISCONTINUED | OUTPATIENT
Start: 2024-11-08 | End: 2024-11-09

## 2024-11-08 RX ORDER — MAGNESIUM SULFATE HEPTAHYDRATE 40 MG/ML
2 INJECTION, SOLUTION INTRAVENOUS EVERY 6 HOURS PRN
Status: DISCONTINUED | OUTPATIENT
Start: 2024-11-08 | End: 2024-11-09

## 2024-11-08 RX ORDER — POTASSIUM CHLORIDE 1.5 G/1.58G
40 POWDER, FOR SOLUTION ORAL EVERY 6 HOURS PRN
Status: DISCONTINUED | OUTPATIENT
Start: 2024-11-08 | End: 2024-11-09

## 2024-11-08 RX ORDER — SODIUM CHLORIDE, SODIUM LACTATE, POTASSIUM CHLORIDE, CALCIUM CHLORIDE 600; 310; 30; 20 MG/100ML; MG/100ML; MG/100ML; MG/100ML
75 INJECTION, SOLUTION INTRAVENOUS CONTINUOUS
Status: DISCONTINUED | OUTPATIENT
Start: 2024-11-08 | End: 2024-11-09

## 2024-11-08 RX ORDER — ENOXAPARIN SODIUM 100 MG/ML
40 INJECTION SUBCUTANEOUS EVERY 24 HOURS
Status: DISCONTINUED | OUTPATIENT
Start: 2024-11-08 | End: 2024-11-09

## 2024-11-08 RX ORDER — CALCIUM GLUCONATE 20 MG/ML
1 INJECTION, SOLUTION INTRAVENOUS EVERY 6 HOURS PRN
Status: DISCONTINUED | OUTPATIENT
Start: 2024-11-08 | End: 2024-11-09

## 2024-11-08 RX ORDER — POTASSIUM CHLORIDE 20 MEQ/1
40 TABLET, EXTENDED RELEASE ORAL EVERY 6 HOURS PRN
Status: DISCONTINUED | OUTPATIENT
Start: 2024-11-08 | End: 2024-11-09

## 2024-11-08 RX ORDER — POTASSIUM CHLORIDE 14.9 MG/ML
20 INJECTION INTRAVENOUS EVERY 6 HOURS PRN
Status: DISCONTINUED | OUTPATIENT
Start: 2024-11-08 | End: 2024-11-09

## 2024-11-08 RX ORDER — INSULIN LISPRO 100 [IU]/ML
0-5 INJECTION, SOLUTION INTRAVENOUS; SUBCUTANEOUS EVERY 4 HOURS
Status: DISCONTINUED | OUTPATIENT
Start: 2024-11-08 | End: 2024-11-09

## 2024-11-08 RX ORDER — POTASSIUM CHLORIDE 20 MEQ/1
20 TABLET, EXTENDED RELEASE ORAL EVERY 6 HOURS PRN
Status: DISCONTINUED | OUTPATIENT
Start: 2024-11-08 | End: 2024-11-09

## 2024-11-08 ASSESSMENT — COGNITIVE AND FUNCTIONAL STATUS - GENERAL
STANDING UP FROM CHAIR USING ARMS: TOTAL
MOBILITY SCORE: 6
PERSONAL GROOMING: TOTAL
DAILY ACTIVITIY SCORE: 6
STANDING UP FROM CHAIR USING ARMS: TOTAL
MOVING TO AND FROM BED TO CHAIR: TOTAL
PERSONAL GROOMING: TOTAL
TURNING FROM BACK TO SIDE WHILE IN FLAT BAD: TOTAL
HELP NEEDED FOR BATHING: TOTAL
PERSONAL GROOMING: TOTAL
TOILETING: TOTAL
MOVING TO AND FROM BED TO CHAIR: TOTAL
MOVING FROM LYING ON BACK TO SITTING ON SIDE OF FLAT BED WITH BEDRAILS: TOTAL
TOILETING: TOTAL
MOVING FROM LYING ON BACK TO SITTING ON SIDE OF FLAT BED WITH BEDRAILS: TOTAL
EATING MEALS: TOTAL
WALKING IN HOSPITAL ROOM: TOTAL
TURNING FROM BACK TO SIDE WHILE IN FLAT BAD: TOTAL
HELP NEEDED FOR BATHING: TOTAL
DAILY ACTIVITIY SCORE: 6
DAILY ACTIVITIY SCORE: 6
MOVING FROM LYING ON BACK TO SITTING ON SIDE OF FLAT BED WITH BEDRAILS: TOTAL
EATING MEALS: TOTAL
CLIMB 3 TO 5 STEPS WITH RAILING: TOTAL
MOBILITY SCORE: 6
TURNING FROM BACK TO SIDE WHILE IN FLAT BAD: TOTAL
DRESSING REGULAR LOWER BODY CLOTHING: TOTAL
CLIMB 3 TO 5 STEPS WITH RAILING: TOTAL
HELP NEEDED FOR BATHING: TOTAL
WALKING IN HOSPITAL ROOM: TOTAL
DRESSING REGULAR LOWER BODY CLOTHING: TOTAL
WALKING IN HOSPITAL ROOM: TOTAL
MOVING TO AND FROM BED TO CHAIR: TOTAL
TOILETING: TOTAL
DRESSING REGULAR UPPER BODY CLOTHING: TOTAL
MOBILITY SCORE: 6
CLIMB 3 TO 5 STEPS WITH RAILING: TOTAL
EATING MEALS: TOTAL
DRESSING REGULAR LOWER BODY CLOTHING: TOTAL
STANDING UP FROM CHAIR USING ARMS: TOTAL

## 2024-11-08 ASSESSMENT — PAIN SCALES - GENERAL
PAINLEVEL_OUTOF10: 0 - NO PAIN

## 2024-11-08 ASSESSMENT — PAIN - FUNCTIONAL ASSESSMENT

## 2024-11-08 ASSESSMENT — ACTIVITIES OF DAILY LIVING (ADL): LACK_OF_TRANSPORTATION: NO

## 2024-11-08 NOTE — PROGRESS NOTES
"Social Work Discharge Planning note:    -Patient discussed during interdisciplinary rounds.   -Team members present: Fellow, PT and OT, and SW  -Plan per medical team: Pt is not medically ready for discharge.   -Payer: [facility]  -Status: Inpatient  -Discharge disposition: TBD - SW will continue to follow for PT and OT recommendations, and to assist with discharge planning.   -Support to Pt/family: Pt did not appear to be oriented so SW met with Pt's daughter, Jada, to offer support and to initiated discharge planning discussion. Per Jada, Pt's sister, Elena, is the Medical POA and will be in later today; Jada will contact Elena and ask her to bring in the document (SW will plan to meet with Elena at a later time).        Jada reported that Pt lives alone in a ranch style home with 2 steps to get in. Jada reported that Pt was \"100%\" independent with ADL's at baseline, including driving, and being vibrant and very active. Pt has a very good support system; she has 4 adult children, one of whom lives across the street from her, and the other's all living within 10 minutes from her. There are no other issues or concerns regarding Pt's home/social situation at this time. SW will continue to follow for emotional/incidental support to Pt/family.    -Potential Barriers: none  -Anticipated Date of Discharge:  11/12/24 11/08/24 1046   Discharge Planning   Living Arrangements Alone   Support Systems Children;Family members   Type of Residence Private residence   Number of Stairs to Enter Residence 2   Number of Stairs Within Residence 0  (ranch style home)   Home or Post Acute Services Other (Comment)  (TBD)   Does the patient need discharge transport arranged? Yes   RoundTrip coordination needed? Yes   Financial Resource Strain   How hard is it for you to pay for the very basics like food, housing, medical care, and heating? Not hard  (per family response)   Housing Stability   In the last 12 months, was there " a time when you were not able to pay the mortgage or rent on time? Y  (per family response)   At any time in the past 12 months, were you homeless or living in a shelter (including now)? N  (per family response)   Transportation Needs   In the past 12 months, has lack of transportation kept you from medical appointments or from getting medications? no  (per family response)   In the past 12 months, has lack of transportation kept you from meetings, work, or from getting things needed for daily living? No  (per family response)          -Denis Wiklins, MSW, LSW

## 2024-11-08 NOTE — H&P
"History Of Present Illness  Laurie Ozuna is a 84 y.o. Right-handed female with history of afib s/p pacemaker and on eliquis, hypertension, hyperlipidemia, low back pain receiving transforaminal epidural (TFE) steroid injections (L5-S1) presenting after being found down at home by family members who called EMS. [facility] was called at 6:56 PM.    Spoke with daughter who said that a family member spoke with the patient at 3:50 PM and conversation was normal. Then family member returned home at 5:30 pm and found the patient on the floor. The patient was unable to speak, had labored breathing, but eyes were open. EMS was then called and too patient to the hospital.    This past Sunday, the patient stopped eliquis in preparation for TFE injection. She received the injection yesterday without issue and resumed eliquis today.    According to family, the patient lives alone. Is completely independent in here ADLs such as driving, cooking, eating, bathing.    Had stroke symptoms resolved at time of presentation: No    Past Medical History  PAST MEDICAL HISTORY   Diagnosis Date    Atrial fibrillation (HCC)      Dyslipidemia      Hypertension      Obesity      Pacemaker      Surgical History  PAST SURGICAL HISTORY   Procedure Laterality Date    CHOLECYSTECTOMY HX        PAST SURGICAL HISTORY OF         surgery to remove kidney stones 40 yrs ago     Social History   Per family, patient lives alone and is completely independent in ADLs. Drives herself, cooks, cleans, feeds self. From chart review \"She reports that she has never smoked. She has never used smokeless tobacco. She reports that she does not drink alcohol and does not use drugs. \"  Allergies  Allergen Reactions    Lisinopril           Cough     Home Medications  Current Outpatient Medications   Medication Instructions    acetaminophen (Tylenol) 500 mg tablet 2 tablets, Daily    amiodarone (PACERONE) 200 mg, oral, Daily    atorvastatin (LIPITOR) 20 mg, oral, Daily    " "bisacodyl (DULCOLAX (BISACODYL)) 10 mg, rectal, Daily    calcium carbonate 600 mg calcium (1,500 mg) tablet 1 tablet, 2 times daily    candesartan (ATACAND) 32 mg, Daily    cyanocobalamin (vitamin B-12) 5,000 mcg, Daily RT    docusate sodium (Colace) 100 mg capsule 1 capsule, 2 times daily PRN    Eliquis 5 mg, oral, 2 times daily    Eliquis 5 mg, oral, 2 times daily    estradiol (ESTRACE) 2 g, Daily    levothyroxine (SYNTHROID, LEVOXYL) 75 mcg, oral, Daily before breakfast    Lidocaine Pain Relief 4 % patch APPLY 1 PATCH AS DIRECTED ONCE DAILY FOR 5 DAYS. REMOVE PATCH AFTER 12 HOURS    melatonin 10 mg tablet 2 tablets, Nightly    metoprolol succinate XL (TOPROL-XL) 25 mg, oral, Daily    multivit-iron-minerals-folic acid (Centrum Silver) 0.4 mg-300 mcg- 250 mcg tab 1 tablet, Daily    potassium chloride CR 20 mEq ER tablet TAKE 1 TABLET(20 MEQ) BY MOUTH ONCE DAILY. DO NOT CRUSH OR CHEW    torsemide (Demadex) 20 mg tablet Take 2 tablets on ,, and 1.5 tablets the other days of the week       Review of Systems  Unable to perform ROS because patient is intubated    Last Recorded Vitals  Blood pressure (!) 183/109, pulse 76, temperature 36.7 °C (98 °F), resp. rate 20, height 1.626 m (5' 4\"), weight 80 kg (176 lb 5.9 oz), SpO2 100%.    Neurological Exam  Physical Exam    GENERAL APPEARANCE:  intubated, sedated  CRANIAL NERVES:  Pupils equal, reactive and midline  No facial assymmetry  MOTOR:  flaccid 4x exam  SENSORY: no response to nox stim     NIHSS on Arrival:  1a  Level of consciousness: 3=responds only with reflex motor or automatic effects or totally unresponsive, flaccid, areflexic   1b. LOC questions:  2=Performs neither task correctly   1c. LOC commands: 2=Performs neither task correctly   2.  Best Gaze: 0=normal   3. Visual: 0=No visual loss   4. Facial Palsy: 0=Normal symmetric movement   5a. Motor Left Arm: 4=No movement   5b.  Motor Right Arm: 4=No movement   6a. Motor Left Le=No movement   6b  Motor " Right Le=No movement   7. Limb Ataxia: 0=Absent   8.  Sensory: 0=Normal; no sensory loss   9. Best Language:  3=Mute, global aphasia; no usable speech or auditory comprehension   10. Dysarthria: 2=Severe; patient speech is so slurred as to be unintelligible in the absence of or our of proportion to any dysphagia, or is mute/anarthric   11. Extinction and Inattention: 0=No abnormality          Total:   28         NIHSS after Intervention:  NIH Stroke Scale      Interval:  Post-thrombectomy  Time: 9:59 PM  Person Administering Scale: Steffi José MD    Administer stroke scale items in the order listed. Record performance in each category after each subscale exam. Do not go back and change scores. Follow directions provided for each exam technique. Scores should reflect what the patient does, not what the clinician thinks the patient can do. The clinician should record answers while administering the exam and work quickly. Except where indicated, the patient should not be coached (i.e., repeated requests to patient to make a special effort).      1a  Level of consciousness: 3=responds only with reflex motor or automatic effects or totally unresponsive, flaccid, areflexic   1b. LOC questions:  2=Performs neither task correctly   1c. LOC commands: 2=Performs neither task correctly   2.  Best Gaze: 0=normal   3.  Visual: 3=Bilateral hemianopia (blind including cortical blindness)   4. Facial Palsy: 0=Normal symmetric movement   5a.  Motor left arm: 2=Some effort against gravity, limb cannot get to or maintain (if cured) 90 (or 45) degrees, drifts down to bed, but has some effort against gravity   5b.  Motor right arm: 3=No effort against gravity, limb falls   6a. motor left leg: 3=No effort against gravity, limb falls   6b  Motor right leg:  3=No effort against gravity, limb falls   7. Limb Ataxia: 0=Absent   8.  Sensory: 0=Normal; no sensory loss   9. Best Language:  3=Mute, global aphasia; no usable speech or  auditory comprehension   10. Dysarthria: 2=Severe; patient speech is so slurred as to be unintelligible in the absence of or our of proportion to any dysphagia, or is mute/anarthric   11. Extinction and Inattention: 0=No abnormality         Total:   26             Relevant Results    CT Head CTA Head /Neck 11/07/2024  IMPRESSION:  1. Loss of gray-white differentiation throughout the left frontoparietal lobe, primarily in the left KAYLEY/MCA territory suspicious for early developing acute infarct.  2. Complete occlusion of the proximal left MCA M1 segment without distal reconstitution or opacification of distal branches.  3. Complete occlusion of the proximal left KAYLEY with minimal faint reconstitution of far distal branches.  4. Poor visualization of the proximal left vertebral artery is likely related to artifact rather than occlusion.  5. No intracranial hemorrhage or calvarial fracture.  6. Right frontal scalp and right periorbital soft tissue swelling/hematoma.  7. Consolidation of the right-greater-than-left pulmonary apices    MR Brain 12/31/2020  IMPRESSION:     No acute intracranial findings.  Age-appropriate appearance of the brain.     Stroke Alert CT/MRI review: Actual date and time 6:56 PM      IV Thrombolysis  IV Thrombolysis Given: No; Thrombolysis contraindication reason: Patient receiving direct thrombin inhibitors or direct Factor Xa inhibitors       Assessment/Plan   Assessment & Plan    Laurie Ozuna is a 84 y.o. Right-handed female with history of afib s/p pacemaker and on eliquis, hypertension, hyperlipidemia, low back pain receiving transforaminal epidural (TFE) steroid injections (L5-S1) presenting after being found down at home by family members who called EMS. Patient was intubated in ED because of respiratory decline. CT head and CTA showed L M1 occlusion with ASPECTS score of 3. NIHSS 28 pre-thrombectomy. TICI 2B. And post thrombectomy NIHSS of 26.    Stroke Metrics:  EMS pre-notification?:  No  Time of stroke team arrival: 19:00  Direct to CT?: No  Time of CTH read by stroke team: already read  Time of TNK: n/a  Time stroke team called IR: 7:20  Time pt arrived to angio suite: unclear  Time of groin puncture: 20:25  Time of recanalization: 21:15  Number of passes: 1  Device used: Trevo stent retriever, penumbra aspiration device   Any delays in the process (if door to TNK >30 min): no    Stroke Classification:  Type: Ischemic stroke  Subtype/etiology: cardioembolic  Vessels involved: L M1  Neurological manifestations:  Pre-Intervention Deficits: NIHSS 28 (LOC3, LOCq/c 2/2, Gaze 1, vis 3, BUE/BLE 4/4, sens 2, lang 3, dysarth 2)  Post-Intervention Deficits: NIHSS 26 (LOC3, LOCq/c 2/2, vis 3, LUE2, RUE 3, BLE 3, sens2, lang3, dysarth2)  Pre-stroke mRS: 0  Initial treatment: Angio  Anti-platelets or Anti-coagulation management: N/A  Vascular Risk Factors: off eliquis  Antiplatelet/antithrombotic plan for stroke prevention: anticoagulation to be discussed  VTE prophylaxis: post-thrombectomy    Vascular Risk Factor modification goals:  Blood pressure goals: avoid hypotension SBP <100 that could worsen cerebral perfusion, Ischemic stroke- early permissive hypertension SBP < 220 mmHg with cautious inpatient lowering  Lipid Goals: education on healthy diet and statin therapy to maintain or achieve goal LDL-cholesterol < 70mg  Glucose Goals: early treatment of hyperglycemia to goal glucose 140-180 mg/dl with long-term goal A1c < 7%   Smoking Cessation and Education  Assessment for Rehabilitation needs   Patient and family education on signs and symptoms of stroke, calling 911, healthy strategies for stroke prevention.      #Left M1 occlusion, ischemic stroke  Eliquis held for the past three days for pain medicine injection. Last known well 3:50 PM when family member called patient. Neurology team saw patient at 7 PM after being paged for left M1 occlusion on CTA. Aspects score 3. NIHSS 28. Mechanical  thrombectomy was performed and was TICI 2b. With post NIHSS 26  - BP goal <160  - rCTH @ 4AM (6h)  - A1C, Lipids  - resume anticoagulation when able  - telemetry  - echo with bubble  - MRI Brain w/o contrast  - PT/OT  - SLP    #Inability to protect airway, intubated and sedated  GCS 10 on arrival to ED. Intubated for increased respiratory effort.  - noted    #Hypertension  - hold home bp meds while permissive hypertension    #Constipation  - miralax    #Hyperglycemia  - monitor    #Hypothyroidism  - levothyroxine    #Afib  - amiodarone 200 mg daily  - metoprolol 25 mg daily  - Eliquis 5 mg BID (holding post thrombectomy)    #Hyperlipidemia  - atorvastatin    #Stage 3a CKD  - has ACE/ARB allergy         F: PRN  E: PRN  N: PRN  A: PIV      Omar Mckeon MD PhD  PGY-2 Neurology

## 2024-11-08 NOTE — PROGRESS NOTES
NEURO CRITICAL CARE DAILY PROGRESS NOTE     Subjective    Naeon, remains intubated this AM, off sedation        Objective   VITALS (24H):  Temp:  [36 °C (96.8 °F)-37.5 °C (99.5 °F)] 36.7 °C (98.1 °F)  Heart Rate:  [] 96  Resp:  [6-29] 20  BP: (105-192)/() 133/58  FiO2 (%):  [30 %-100 %] 30 %  INTAKE/OUTPUT:  Intake/Output Summary (Last 24 hours) at 11/8/2024 1128  Last data filed at 11/8/2024 0900  Gross per 24 hour   Intake 316.64 ml   Output 945 ml   Net -628.36 ml     VENT SETTINGS:  Vent Mode: Volume control/assist control  FiO2 (%):  [30 %-100 %] 30 %  S RR:  [12-16] 12  S VT:  [400 mL] 400 mL  PEEP/CPAP (cm H2O):  [5 cm H20] 5 cm H20  MAP (cm H2O):  [8-11] 8     PHYSICAL EXAM:  NEURO:  - Off precedex   - Eyes briefly open to nox stim,   - Left gaze preference   - LUE spontaneous/AG, LLE wd to nox stim   - RUE flicker wd to nox, not antigravity   - RLE TF    CV:  -irregularly irregular   RESP:  - intubated  - Oxygen: Ventilator  :  - Indwelling patterson catheter in place  GI:  - Abdomen NT/ND, soft  SKIN:  - Intact  - right frontal cephalohematoma    1a. LOC  1b. Month and Age  1c. Blink and Make fist  2 Best Gaze  3. Visual  4. Facial Palsy  5A. Motor - L Arm  5B. Motor - R Arm  6A. Motor - L Leg  6B. Motor - R Leg  7. Limb Ataxia  8. Sensory Loss  9. Best Language  10. Dysarthria  11. Extinction and Inattention   Total NIHSS 2  2  2  1  0  0  3  3  3  3  0  0  0  0  0  19         MEDICATIONS:  Scheduled: PRN: Continuous:   [Held by provider] amiodarone, 200 mg, oral, Daily  atorvastatin, 40 mg, oral, Nightly  enoxaparin, 40 mg, subcutaneous, q24h  insulin lispro, 0-5 Units, subcutaneous, q4h  levothyroxine, 75 mcg, oral, Daily  metoprolol succinate XL, 25 mg, oral, Daily  pantoprazole, 40 mg, oral, Daily before breakfast   Or  pantoprazole, 40 mg, intravenous, Daily before breakfast  perflutren protein A microsphere, 0.5 mL, intravenous, Once in imaging  polyethylene glycol, 17 g, oral,  Daily  sulfur hexafluoride microsphr, 2 mL, intravenous, Once in imaging     PRN medications: calcium gluconate, calcium gluconate, dextrose, dextrose, glucagon, glucagon, hydrALAZINE **FOLLOWED BY** [START ON 11/9/2024] hydrALAZINE, labetaloL, magnesium sulfate, magnesium sulfate, oxygen, oxygen, potassium chloride CR **OR** potassium chloride, potassium chloride CR **OR** potassium chloride, potassium chloride dexmedeTOMIDine, 0.1-1.5 mcg/kg/hr         LAB RESULTS:  Results from last 72 hours   Lab Units 11/08/24  0108 11/07/24  1806   WBC AUTO x10*3/uL 17.0* 11.8*   HEMOGLOBIN g/dL 11.7* 12.4   HEMATOCRIT % 37.0 40.6   PLATELETS AUTO x10*3/uL 219 175        Results from last 72 hours   Lab Units 11/08/24  0108 11/07/24  1806   SODIUM mmol/L 141 141   POTASSIUM mmol/L 4.4 4.2   CHLORIDE mmol/L 100 100   CO2 mmol/L 28 30   BUN mg/dL 24* 27*   CREATININE mg/dL 1.00 1.08*   MAGNESIUM mg/dL 2.28 2.53*   PHOSPHORUS mg/dL 4.7  --       Assessment/Plan    84 y.o. female with a PMHx of afib (s/p ablation, ppm, on eliquis lasts dose 11/7), HTN, HLD admitted on 11/7 for acute L MCA stroke. Patient was in her usual state of health and was found down at home. On arrival to the ED was moving her L > R but was unable to maintain secretions and was intubated. CTH/CTA showed L M1 occlusion, ASPECTS 3, no s/p MT TICI 2B. No TNK d/t OOW. NIH Pre MT (s/p rocuronium 28), post MT (26), improved to 19 11/8 AM.     NEURO:  #Acute ischemic L MCA stroke, TICI 2b  Assessment:  - Neurologically: see exam above  Plan:  - NSU, Neuro Checks: Q1H,  Sedation: Dexmedetomidine,  Pain: None   Nausea: None , PT/OT/SLP  - repeat CTH at 24 hours post MT 11/8 19:00, if stable start ASA 81 mg daily   - Start DVT prophylaxis   - Hold Eliquis      CARDIOVASCULAR:  #Atrial Fibrillation s/p AVNRT 2006, pacemaker 2018  #[place] Cardiomyopathy    Assessment:  - TTE 11/8 - LVEF 60%, no PFO, hypokinetic apical lateral segment  (new comapred to TTE 09/23'')    Plan:  - Continue to monitor on telemetry  - BP goal: SBP < 160    --> PRN: Labetalol and Hydralazine   - Obtain TTE  - Resume AC when able  - Hold amiodarone until restarted on AC    RESPIRATORY:  #Inability to protect airway 2/2 increased WOB and AMS  Assessment:  - On ventilator  Plan:  - Continuous pulse oximetry   - O2 PRN to maintain SpO2 > 94%, wean as tolerated  - Ventilator bundle while intubated  - CPAP trial in AM    RENAL/:  #Stage 3a CKD  Assessment:  - Baseline BUN/Cr: 26/1.2  Results from last 72 hours   Lab Units 24  01024  1806   BUN mg/dL 24* 27*   CREATININE mg/dL 1.00 1.08*       Plan:  - Monitor with daily RFP  - Maintain patterson catheter for: critically ill patient who need accurate urinary output measurements    FEN/GI:  #No active issues  Assessment:  -  Last BM Date:  (PTA)    Plan:  - Monitor and replace electrolytes per protocol  - Diet: NPO   - Bowel Regimen: Miralax QD  - Nutrition recs    ENDOCRINE:  #Hypothyroidism  #Hyperglycemia  Assessment:  Results from last 7 days   Lab Units 24  0752 24  0334 24  0108 24  0017 24  2230 24  1806   POCT GLUCOSE mg/dL 153* 149*  --  169* 182*  --    GLUCOSE mg/dL  --   --  172*  --   --  129*      Plan:  - Accuchecks & ISS Q4H   - Continue home synthroid 75 mcg daily    HEMATOLOGY:  #No active issues  Assessment:  - Baseline Hgb: 12  - Baseline Plts: 175  Results from last 7 days   Lab Units 24  0108 24  1806   HEMOGLOBIN g/dL 11.7* 12.4   HEMATOCRIT % 37.0 40.6   PLATELETS AUTO x10*3/uL 219 175     Plan:  - Continue to monitor with daily CBC and Coag panel    INFECTIOUS DISEASE:  #Leukocytosis-likely reactive  Assessment:  Results from last 7 days   Lab Units 24  0108 24  1806   WBC AUTO x10*3/uL 17.0* 11.8*    - Temp (24hrs), Av.6 °C (97.8 °F), Min:36 °C (96.8 °F), Max:37.5 °C (99.5 °F)     Plan:  - Continue to monitor for s/sx of infection  - Pan culture for  temperature > 38.4 C    MUSCULOSKELETAL:  - No acute issues    SKIN:  - No acute issues  - Turns and skin care per NSU protocol    ACCESS:  - PIVs    PROPHYLAXIS:  - DVT Ppx: SCDs, Lovenox   - GI Ppx: Pantoprazole    RESTRAINTS:  I agree with nursing assessment of the patient's need for restraints to protect the patient from injury and facilitate healing. The patient is unable to cooperate with the plan of care and at risk for disrupting critical therapy (i.e., removing medical devices, lines, tubes and/or dressings).  Please see order for specifics. Restraints can be removed when the patient is able to cooperate with plan of care and allow healing to occur, or the medical devices at risk are discontinued by the medical team.     Tamy Fajardo MD  Neuroscience Intensive Care

## 2024-11-08 NOTE — CONSULTS
"Nutrition Initial Assessment:   Nutrition Assessment         Patient is a 84 y.o. female found down at home - intubated in ED.   CTH/CTA showed L M1 occlusion.  No TNK d/t OOW.  Now s/p MT.    Pt currently remains intubated with OGT in place.     Nutrition History:  Energy Intake: Good > 75 %  Food and Nutrient History: Pt's daughters reported good intake PTA. Mentioned that she cooked all of her food and was watching her sodium intake intently. For breakfast she typically would have yogurt with berries, lunch would be some sort of protein and salad, and then for dinner she typically would have soup and then something she had cooked (such as chicken paprikash). Reported she would try to have a protein, vegetable, and starch at dinner. Stated she liked to have fruit for snacks.  Vitamin/Herbal Supplement Use: Calcium and magnesium  Food Allergies/Intolerances:   shellfish/white fish (per daughters)  GI Symptoms: Constipation and Diarrhea - on/off       Anthropometrics:  Height: 162.6 cm (5' 4.02\")   Weight: 94.8 kg (209 lb)   BMI (Calculated): 35.86  IBW/kg (Dietitian Calculated): 54.5 kg  Percent of IBW: 174 %  Adjusted Body Weight (kg): 64.6 kg    Weight History:   Wt Readings from Last 10 Encounters:   11/08/24 94.8 kg (209 lb)     Weight Change %:  Weight History / % Weight Change: Daughters reported stable weight with no recent weight loss    Nutrition Focused Physical Exam Findings:    Subcutaneous Fat Loss:   Orbital Fat Pads: Well nourished (slightly bulging fat pads)  Buccal Fat Pads: Defer  Triceps: Well nourished (ample fat tissue)  Ribs: Defer  Muscle Wasting:  Temporalis: Defer  Pectoralis (Clavicular Region): Well nourished (clavicle not visible)  Deltoid/Trapezius: Mild-Moderate (slight protrusion of acromion process)  Interosseous: Mild-Moderate (slightly depressed area between thumb and forefinger)  Trapezius/Infraspinatus/Supraspinatus (Scapular Region): Defer  Quadriceps: Well nourished (well " developed, well rounded)  Gastrocnemius: Defer  Physical Findings:  Hair: Negative  Eyes:  (JOCELYNE)  Mouth:  (JOCELYNE)  Nails: Negative    Nutrition Significant Labs:  CBC Trend:   Results from last 7 days   Lab Units 11/08/24  0108 11/07/24  1806   WBC AUTO x10*3/uL 17.0* 11.8*   RBC AUTO x10*6/uL 4.40 4.55   HEMOGLOBIN g/dL 11.7* 12.4   HEMATOCRIT % 37.0 40.6   MCV fL 84 89   PLATELETS AUTO x10*3/uL 219 175    , BMP Trend:   Results from last 7 days   Lab Units 11/08/24  0108 11/07/24  1806   GLUCOSE mg/dL 172* 129*   CALCIUM mg/dL 8.6 9.4   SODIUM mmol/L 141 141   POTASSIUM mmol/L 4.4 4.2   CO2 mmol/L 28 30   CHLORIDE mmol/L 100 100   BUN mg/dL 24* 27*   CREATININE mg/dL 1.00 1.08*    , A1C:  Lab Results   Component Value Date    HGBA1C 6.4 (H) 11/07/2024   , BG POCT trend:   Results from last 7 days   Lab Units 11/08/24  1210 11/08/24  0752 11/08/24  0334 11/08/24  0017 11/07/24  2230   POCT GLUCOSE mg/dL 157* 153* 149* 169* 182*    , Renal Lab Trend:   Results from last 7 days   Lab Units 11/08/24  0108 11/07/24  1806   POTASSIUM mmol/L 4.4 4.2   PHOSPHORUS mg/dL 4.7  --    SODIUM mmol/L 141 141   MAGNESIUM mg/dL 2.28 2.53*   EGFR mL/min/1.73m*2 55* 50*   BUN mg/dL 24* 27*   CREATININE mg/dL 1.00 1.08*        Nutrition Specific Medications:  Scheduled medications  insulin lispro, 0-5 Units, subcutaneous, q4h  levothyroxine, 75 mcg, oral, Daily  pantoprazole, 40 mg, oral, Daily before breakfast   Or  pantoprazole, 40 mg, intravenous, Daily before breakfast  polyethylene glycol, 17 g, oral, Daily    Continuous medications  dexmedeTOMIDine, 0.1-1.5 mcg/kg/hr  lactated Ringer's, 75 mL/hr, Last Rate: 75 mL/hr (11/08/24 1411)      I/O:   Last BM Date:  (PTA); Stool Appearance: Unable to assess (11/08/24 1200)    Dietary Orders (From admission, onward)       Start     Ordered    11/07/24 2234  May Not Participate in Room Service  ( ROOM SERVICE MAY NOT PARTICIPATE)  Once        Question:  .  Answer:  Yes    11/07/24 2234     11/07/24 2217  NPO Diet; Effective now  Diet effective now        Comments: Until Swallow Assessment completed by RN or provider    11/07/24 2216         Estimated Needs:   Total Energy Estimated Needs (kCal):  (7128-2714)  Method for Estimating Needs: 11-14kcal/kg per act wt (ASPEN Critical Care guidelines BMI >30)  Total Protein Estimated Needs (g): 110 g  Method for Estimating Needs: 2.0g/kg per IBW (ASPEN Critical Care guidelines BMI >30)  Total Fluid Estimated Needs (mL):  (per team)        Nutrition Diagnosis   Malnutrition Diagnosis  Patient has Malnutrition Diagnosis: No    Nutrition Diagnosis  Patient has Nutrition Diagnosis: Yes  Diagnosis Status (1): New  Nutrition Diagnosis 1: Increased nutrient needs  Related to (1): increased metabolic demand  As Evidenced by (1): stroke, critical illness       Nutrition Interventions/Recommendations         Nutrition Prescription:  Individualized Nutrition Prescription Provided for : enteral nutrition on vent to provide 1350kcals and 107g pro        Nutrition Interventions:   Enteral Nutrition  Start Vital AF 1.2 @ 10ml/hr, increase by 10mls every 8hrs to reach goal of 40ml/hr.   Provide 2 packets Pro Stat AWC daily.   Additional water flushes per team   TF provides ~780mls free H2O       Nutrition Monitoring and Evaluation   Food/Nutrient Related History Monitoring  Monitoring and Evaluation Plan: Enteral and parenteral nutrition intake  Enteral and Parenteral Nutrition Intake: Enteral nutrition intake  Criteria: meet >/=75% of needs; TF tolerance    Body Composition/Growth/Weight History  Monitoring and Evaluation Plan: Weight  Weight: Measured weight  Criteria: Weekly weights    Biochemical Data, Medical Tests and Procedures  Monitoring and Evaluation Plan: Electrolyte/renal panel, Glucose/endocrine profile  Criteria: WNL with TF titration    Time Spent (min): 60 minutes

## 2024-11-08 NOTE — ED PROCEDURE NOTE
Procedure  Intubation    Performed by: Miguelina Emmanuel MD  Authorized by: Marielos Yoon MD    Consent:     Consent obtained:  Emergent situation  Universal protocol:     Patient identity confirmed:  Anonymous protocol, patient vented/unresponsive  Pre-procedure details:     Indications: airway obstruction, airway protection and altered consciousness      Patient status:  Altered mental status    Look externally: large tongue      Mouth opening - incisor distance:  Unable to open    Mallampati score:  I    Neck mobility: reduced      C-collar present: yes      Pharmacologic strategy: RSI      Induction agents:  Etomidate    Paralytics:  Rocuronium  Procedure details:     Preoxygenation:  Bag valve mask    CPR in progress: no      Number of attempts:  1  Successful intubation attempt details:     Intubation method:  Oral    Intubation technique: video assisted      Laryngoscope blade:  Hypercurved    Bougie used: no      Grade view: I      Tube size (mm):  7.5    Tube type:  Cuffed    Tube visualized through cords: yes    Placement assessment:     ETT at teeth/gumline (cm):  24    Tube secured with:  ETT mcallister    Breath sounds:  Equal    Placement verification: chest rise, colorimetric ETCO2, CXR verification, direct visualization and equal breath sounds      CXR findings:  Appropriate position  Post-procedure details:     Procedure completion:  Tolerated      Miguelina Emmanuel MD  Emergency Medicine, PGY-2           Miguelina Emmanuel MD  Resident  11/08/24 [id number]

## 2024-11-08 NOTE — ED PROCEDURE NOTE
Procedure  Critical Care    Performed by: Marielos Yoon MD  Authorized by: Marielos Yoon MD    Critical care provider statement:     Critical care time (minutes):  40    Critical care time was exclusive of:  Separately billable procedures and treating other patients    Critical care was necessary to treat or prevent imminent or life-threatening deterioration of the following conditions:  CNS failure or compromise, respiratory failure and trauma    Critical care was time spent personally by me on the following activities:  Ordering and performing treatments and interventions, ordering and review of laboratory studies, ordering and review of radiographic studies, discussions with consultants, examination of patient, re-evaluation of patient's condition, pulse oximetry, obtaining history from patient or surrogate and ventilator management               Marielos Yoon MD  11/07/24 [id number]

## 2024-11-08 NOTE — CARE PLAN
Problem: Pain  Goal: Takes deep breaths with improved pain control throughout the shift  Outcome: Progressing  Goal: Turns in bed with improved pain control throughout the shift  Outcome: Progressing  Goal: Walks with improved pain control throughout the shift  Outcome: Progressing  Goal: Performs ADL's with improved pain control throughout shift  Outcome: Progressing  Goal: Participates in PT with improved pain control throughout the shift  Outcome: Progressing  Goal: Free from opioid side effects throughout the shift  Outcome: Progressing  Goal: Free from acute confusion related to pain meds throughout the shift  Outcome: Progressing     Problem: Fall/Injury  Goal: Not fall by end of shift  Outcome: Progressing  Goal: Be free from injury by end of the shift  Outcome: Progressing  Goal: Verbalize understanding of personal risk factors for fall in the hospital  Outcome: Progressing  Goal: Verbalize understanding of risk factor reduction measures to prevent injury from fall in the home  Outcome: Progressing  Goal: Use assistive devices by end of the shift  Outcome: Progressing  Goal: Pace activities to prevent fatigue by end of the shift  Outcome: Progressing     Problem: Pain - Adult  Goal: Verbalizes/displays adequate comfort level or baseline comfort level  Outcome: Progressing     Problem: Safety - Adult  Goal: Free from fall injury  Outcome: Progressing     Problem: Discharge Planning  Goal: Discharge to home or other facility with appropriate resources  Outcome: Progressing     Problem: Chronic Conditions and Co-morbidities  Goal: Patient's chronic conditions and co-morbidity symptoms are monitored and maintained or improved  Outcome: Progressing     Problem: Skin  Goal: Decreased wound size/increased tissue granulation at next dressing change  Outcome: Progressing  Goal: Participates in plan/prevention/treatment measures  Outcome: Progressing  Goal: Prevent/manage excess moisture  Outcome: Progressing  Goal:  Prevent/minimize sheer/friction injuries  Outcome: Progressing  Goal: Promote/optimize nutrition  Outcome: Progressing  Goal: Promote skin healing  Outcome: Progressing   The patient's goals for the shift include      The clinical goals for the shift include pt to remain neurologically stable through end of shift

## 2024-11-08 NOTE — PROGRESS NOTES
Communication Note    Patient Name: Laurie Ozuna  MRN: [id number]  Today's Date: 11/8/2024   Room: 01/01A    Discipline: Physical Therapy      Missed Visit: Yes  Missed Visit Reason:  (Pt with RASS of -5, not following commands per RN and not appropriate for PT at this time.)      11/08/24 at 8:55 AM   Barbara Franks, PT   Rehab Office: [phone]

## 2024-11-08 NOTE — PROGRESS NOTES
[facility] Echo BIB [facility]. Found unresponsive. Patient taken to CT scan and xray. SW will continue to follow for assistance with discharge planning needs.    Laurie Ozuna [birth year]  MRN# [id number]    Sw contacted daughter Elena via phone, trauma team provided update. She is currently in waiting room at [facility] [place] [facility].

## 2024-11-08 NOTE — H&P
[facility]  TRAUMA SERVICE - HISTORY AND PHYSICAL / CONSULT    Patient Name: Crystal Rios Echo  MRN: [id number]  Admit Date: 1107  : 1940  AGE: 84 y.o.   GENDER: female  ==============================================================================  MECHANISM OF INJURY / CHIEF COMPLAINT:   84 YOF presented to [facility] as a full trauma activation s/p presumed fall from standing with depressed GCS. Reportedly on Eliquis. Last known well from family was ~15:50. On Presentation patient GCS 10 (E4, V2, M6 with R>L). Patient with sonorous respiration with significant oral secretions. At that time decision was made to intubate patient prior to CT scans. CXR obtained after intubation to verify ETT placement. CT imaging notable for acute ischemic stroke; BAT called at 18:56.     LOC (yes/no?): unknown  Anticoagulant / Anti-platelet Rx? (for what dx?): Eliquis  Referring Facility Name (N/A for scene EMR run): n/a    INJURIES:   Acute L MCA ischemic CVA  R frontal cephalohematoma     OTHER MEDICAL PROBLEMS:  Unknown    INCIDENTAL FINDINGS:  Na    ==============================================================================  ADMISSION PLAN OF CARE:  CT pan-scan; no acute traumatic injuries noted  BAT called, neurology bedside  Mechanical thrombectomy  Labs  EKG  Consultants notified (specialty, provider name, time): Neurology    Dispo: Admit to NSU under neurology service. Trauma will s/o at this time.     Total face to face time spent with patient/family of 20 minutes, with >50% of the time spent discussing plan of care/management, counseling/educating on disease processes, explaining results of diagnostic testing.    Patient discussed with attending, Dr. Carrillo Strong, PA-C  Trauma, Critical Care, and Acute Care Surgery  [id number]   ==============================================================================  PAST MEDICAL HISTORY:   PMH: unknown  No past medical history  on file.  Last menstrual period: n/a    PSH: unknown  No past surgical history on file.  FH: unknown  No family history on file.  SOCIAL HISTORY:    Smoking: unknown   Social History     Tobacco Use   Smoking Status Not on file   Smokeless Tobacco Not on file       Alcohol: unknown   Social History     Substance and Sexual Activity   Alcohol Use Not on file       Drug use: unknown    MEDICATIONS: Eliquis  Prior to Admission medications    Not on File     ALLERGIES: Unknown  Not on File    REVIEW OF SYSTEMS:  Review of Systems   Reason unable to perform ROS: AMS.     PHYSICAL EXAM:  PRIMARY SURVEY:  Airway  Airway is patent.     Breathing  Breathing is labored. Right breath sounds are normal. Left breath sounds are normal.     Circulation  Cardiac rhythm is regular. Rate is regular.   Pulses  Radial: 2+ on the right; 2+ on the left.  Femoral: 2+ on the right; 2+ on the left.  Pedal: 2+ on the right; 2+ on the left.    Disability  Alo Coma Score  Eye:4   Verbal:2   Motor:4      10  Pupils  Right Pupil:   round and reactive        Left Pupil:   round and reactive                   SECONDARY SURVEY/PHYSICAL EXAM:  Physical Exam  Constitutional:       General: She is in acute distress.      Appearance: Normal appearance.   HENT:      Head:      Comments: R frontal scalp cephalohematoma      Nose: Nose normal.      Mouth/Throat:      Mouth: Mucous membranes are moist.   Eyes:      Pupils: Pupils are equal, round, and reactive to light.   Cardiovascular:      Rate and Rhythm: Normal rate.      Pulses: Normal pulses.   Pulmonary:      Effort: Respiratory distress present.      Comments: Coarse breath sounds on left  Abdominal:      General: Abdomen is flat.      Palpations: Abdomen is soft.   Musculoskeletal:         General: No signs of injury.      Cervical back: No tenderness.   Neurological:      Comments: GCS 10 (E4, V2, M4)  Weaker withdrawal in the LLE compared to right       IMAGING SUMMARY:  (summary of  findings, not a copy of dictation)  CT Head/Face: L MCA ischemic CVA  CT C-Spine: no acute traumatic injury  CT Chest/Abd/Pelvis: no acute traumatic injury  CXR/PXR: no acute traumatic injury  Other(s): no acute traumatic injury    LABS:  Results from last 7 days   Lab Units 11/07/24  1806   WBC AUTO x10*3/uL 11.8*   HEMOGLOBIN g/dL 12.4   HEMATOCRIT % 40.6   PLATELETS AUTO x10*3/uL 175   NEUTROS PCT AUTO % 77.3   LYMPHS PCT AUTO % 14.2   MONOS PCT AUTO % 7.8   EOS PCT AUTO % 0.0     Results from last 7 days   Lab Units 11/07/24  1806   INR  1.2*     Results from last 7 days   Lab Units 11/07/24  1806   SODIUM mmol/L 141   POTASSIUM mmol/L 4.2   CHLORIDE mmol/L 100   CO2 mmol/L 30   BUN mg/dL 27*   CREATININE mg/dL 1.08*   CALCIUM mg/dL 9.4   PROTEIN TOTAL g/dL 7.7   BILIRUBIN TOTAL mg/dL 0.7   ALK PHOS U/L 131   ALT U/L 24   AST U/L 26   GLUCOSE mg/dL 129*     Results from last 7 days   Lab Units 11/07/24  1806   BILIRUBIN TOTAL mg/dL 0.7           I have reviewed all laboratory and imaging results ordered/pertinent for this encounter.

## 2024-11-08 NOTE — SIGNIFICANT EVENT
Goals of Care Discussion     Discussed code status with Ms. Ozuna's daughter. Explained that given the size of this L MCA stroke, if she were to sustain a second insult requiring resuscitation the chance of a meaningful recovery is slim. Ms. Parker discussed this with her brother and family, and they also found Ms. Ozuna's will which stated that she would like to be DNR. Code status changed to DNR arrest.     Tamy Fajardo MD  Department of Neurology, PGY-3

## 2024-11-08 NOTE — PROGRESS NOTES
[facility]  NEUROSCIENCE INTENSIVE CARE UNIT  DAILY PROGRESS NOTE       Patient Name: Crystal Rios Echo   MRN: [id number]     Admit Date: 2024     : 1940 AGE: 84 y.o. GENDER: female        Subjective    84 y.o. female with past medical history of atrial fibrillation (pacemaker, on eliquis), hypertension, hyperlipidemia, and low back pain receiving transforaminal epidural (TFE) steroid injections (L5-S1).  She presented to [facility] ED after she was found on the ground with labored breathing and unable to speak. Per chart review, she stopped taking her eliquis on 11/3/24 in preparation for TFE injection on 24. She resumed taking her eliquis today 24. CTH and CTA H/N on 24 showed L M1 occlusion, ischemic stroke. The patient's NIHSS prior to MT was 28. NIHSS s/p MT 26. TICI 2b. Admitted 2024 to the NSU with Acute ischemic left MCA stroke (Multi).    Significant Events:  - Mechanical thrombectomy, TICI 2b, XPER CT c/f hemorrhage vs contrast staining    Interval Events: Admitted to NSU     Objective   VITALS (24H):  Temp:  [36 °C (96.8 °F)-36.7 °C (98 °F)] 36 °C (96.8 °F)  Heart Rate:  [60-96] 76  Resp:  [6-29] 24  BP: (105-192)/() 133/44  FiO2 (%):  [35 %-100 %] 35 %  INTAKE/OUTPUT:  Intake/Output Summary (Last 24 hours) at 2024 0202  Last data filed at 2024 0100  Gross per 24 hour   Intake --   Output 150 ml   Net -150 ml     VENT SETTINGS:  Vent Mode: Volume control/assist control  FiO2 (%):  [35 %-100 %] 35 %  S RR:  [12-16] 16  S VT:  [400 mL] 400 mL  PEEP/CPAP (cm H2O):  [5 cm H20] 5 cm H20  MAP (cm H2O):  [11] 11     PHYSICAL EXAM:  NEURO:  - Propofol paused for exam  - ECNS, left gaze preference  - LUE spontaneous/AG, RUE withdraws to nox stim, BLE spontaneous  CV:  -RRR on telemetry, NSR  RESP:  - intubated  - Oxygen: Ventilator  :  - Indwelling patterson catheter in place  GI:  - Abdomen NT/ND, soft  SKIN:  - Intact  - right frontal  cephalohematoma    MEDICATIONS:  Scheduled: PRN: Continuous:   [Held by provider] amiodarone, 200 mg, oral, Daily  atorvastatin, 40 mg, oral, Nightly  levothyroxine, 75 mcg, oral, Daily  metoprolol succinate XL, 25 mg, oral, Daily  pantoprazole, 40 mg, oral, Daily before breakfast   Or  pantoprazole, 40 mg, intravenous, Daily before breakfast  perflutren lipid microspheres, 0.5-10 mL of dilution, intravenous, Once in imaging  perflutren protein A microsphere, 0.5 mL, intravenous, Once in imaging  polyethylene glycol, 17 g, oral, Daily  sulfur hexafluoride microsphr, 2 mL, intravenous, Once in imaging     PRN medications: dextrose, dextrose, glucagon, glucagon, hydrALAZINE **FOLLOWED BY** [START ON 11/9/2024] hydrALAZINE, labetaloL, oxygen, oxygen dexmedeTOMIDine, 0.1-1.5 mcg/kg/hr         LAB RESULTS:  Results from last 72 hours   Lab Units 11/07/24  1806   GLUCOSE mg/dL 129*   SODIUM mmol/L 141   POTASSIUM mmol/L 4.2   CHLORIDE mmol/L 100   CO2 mmol/L 30   ANION GAP mmol/L 15   BUN mg/dL 27*   CREATININE mg/dL 1.08*   EGFR mL/min/1.73m*2 50*   CALCIUM mg/dL 9.4   ALBUMIN g/dL 4.4   MAGNESIUM mg/dL 2.53*      Results from last 72 hours   Lab Units 11/07/24  1806   WBC AUTO x10*3/uL 11.8*   NRBC AUTO /100 WBCs 0.0   RBC AUTO x10*6/uL 4.55   HEMOGLOBIN g/dL 12.4   HEMATOCRIT % 40.6   MCV fL 89   MCH pg 27.3   MCHC g/dL 30.5*   RDW % 15.3*   PLATELETS AUTO x10*3/uL 175   NEUTROS PCT AUTO % 77.3   IG PCT AUTO % 0.6   LYMPHS PCT AUTO % 14.2   MONOS PCT AUTO % 7.8   EOS PCT AUTO % 0.0   BASOS PCT AUTO % 0.1   NEUTROS ABS x10*3/uL 9.11*   IG AUTO x10*3/uL 0.07   LYMPHS ABS AUTO x10*3/uL 1.67   MONOS ABS AUTO x10*3/uL 0.92*   EOS ABS AUTO x10*3/uL 0.00   BASOS ABS AUTO x10*3/uL 0.01        IMAGING RESULTS:  XR abdomen 1 view   Final Result   Orogastric tube within the stomach.   Signed by Carlo Osborn MD      CT thoracic spine wo IV contrast   Final Result   1. Endotracheal tube in good position.   2. Minimal groundglass  opacity right mid and lower lung: pneumonia vs   aspiration vs contusion. CXR follow-up advised.   3. No associated pleural effusion or pneumothorax.   4. No detectable parenchymal or visceral traumatic injury in the   abdomen or pelvis.   5. No detectable fracture in the thoracic or lumbar spine.   6. Pacemaker.   7. Remainder as above.   Signed by Cade Sheppard MD      CT lumbar spine wo IV contrast   Final Result   1. Endotracheal tube in good position.   2. Minimal groundglass opacity right mid and lower lung: pneumonia vs   aspiration vs contusion. CXR follow-up advised.   3. No associated pleural effusion or pneumothorax.   4. No detectable parenchymal or visceral traumatic injury in the   abdomen or pelvis.   5. No detectable fracture in the thoracic or lumbar spine.   6. Pacemaker.   7. Remainder as above.   Signed by Cade Sheppard MD      CT chest abdomen pelvis w IV contrast   Final Result   1. Endotracheal tube in good position.   2. Minimal groundglass opacity right mid and lower lung: pneumonia vs   aspiration vs contusion. CXR follow-up advised.   3. No associated pleural effusion or pneumothorax.   4. No detectable parenchymal or visceral traumatic injury in the   abdomen or pelvis.   5. No detectable fracture in the thoracic or lumbar spine.   6. Pacemaker.   7. Remainder as above.   Signed by Cade Sheppard MD      CT head W O contrast trauma protocol   Final Result   1. Loss of gray-white differentiation throughout the left   frontoparietal lobe, primarily in the left KAYLEY/MCA territory   suspicious for early developing acute infarct.   2. Complete occlusion of the proximal left MCA M1 segment without   distal reconstitution or opacification of distal branches.   3. Complete occlusion of the proximal left KAYLEY with minimal faint   reconstitution of far distal branches.   4. Poor visualization of the proximal left vertebral artery is likely   related to artifact rather than occlusion.   5. No intracranial  hemorrhage or calvarial fracture.   6. Right frontal scalp and right periorbital soft tissue   swelling/hematoma.   7. Consolidation of the right-greater-than-left pulmonary apices.        I personally reviewed the image(s)/study and resident interpretation   as stated by Dr. Kelly Dhillon MD. I agree with the findings as   stated. This study was interpreted at [place], [place].        MACRO:   Kelly Dhillon discussed the significance and urgency of this   critical finding in person with  trauma surgery on 11/7/2024 at 7:10   pm.  (**-RCF-**) Findings:  See findings.        Signed by: Evan Finkelstein 11/7/2024 8:00 PM   Dictation workstation:   [id number]      CT cervical spine wo IV contrast   Final Result   CT MAXILLOFACIAL SKELETON:   1. No acute maxillofacial fracture.   2. Right frontal scalp/periorbital soft tissue swelling. No   retrobulbar hematoma.             CT CERVICAL SPINE:   1. No acute fracture or traumatic malalignment of the cervical spine             I personally reviewed the image(s)/study and resident interpretation   as stated by Dr. Kelly Dhillon MD. I agree with the findings as   stated. This study was interpreted at [facility], [place], [place].        MACRO:   None.        Signed by: Evan Finkelstein 11/7/2024 8:12 PM   Dictation workstation:   [id number]      CT maxillofacial bones wo IV contrast   Final Result   CT MAXILLOFACIAL SKELETON:   1. No acute maxillofacial fracture.   2. Right frontal scalp/periorbital soft tissue swelling. No   retrobulbar hematoma.             CT CERVICAL SPINE:   1. No acute fracture or traumatic malalignment of the cervical spine             I personally reviewed the image(s)/study and resident interpretation   as stated by Dr. Kelly Dhillon MD. I agree with the findings as   stated. This study was interpreted at [facility]    [place], [place].        MACRO:   None.        Signed by: Evan Finkelstein 11/7/2024 8:12 PM   Dictation workstation:   [id number]      CT angio head and neck w and wo IV contrast   Final Result   1. Loss of gray-white differentiation throughout the left   frontoparietal lobe, primarily in the left KAYLEY/MCA territory   suspicious for early developing acute infarct.   2. Complete occlusion of the proximal left MCA M1 segment without   distal reconstitution or opacification of distal branches.   3. Complete occlusion of the proximal left KAYLEY with minimal faint   reconstitution of far distal branches.   4. Poor visualization of the proximal left vertebral artery is likely   related to artifact rather than occlusion.   5. No intracranial hemorrhage or calvarial fracture.   6. Right frontal scalp and right periorbital soft tissue   swelling/hematoma.   7. Consolidation of the right-greater-than-left pulmonary apices.        I personally reviewed the image(s)/study and resident interpretation   as stated by Dr. Kelly Dhillon MD. I agree with the findings as   stated. This study was interpreted at [facility], [place], [place].        MACRO:   Kelly Dhillon discussed the significance and urgency of this   critical finding in person with  trauma surgery on 11/7/2024 at 7:10   pm.  (**-RCF-**) Findings:  See findings.        Signed by: Evan Finkelstein 11/7/2024 8:00 PM   Dictation workstation:   [id number]      XR chest 1 view   Final Result   1.Mild enlargement of the cardiomediastinal silhouette with   pacemaker in place.   2.Mild accentuation of markings in the lungs which could be   accentuated by limited inspiration. No dense consolidation or   pneumothorax.   3.Endotracheal tube in place with the tip about 3.0 cm above the   level of the [place].   Signed by Dominga Renteria DO        Assessment/Plan    NEURO:  #Acute ischemic L MCA stroke, TICI  2b  Assessment:  - Neurologically: see exam above  Plan:  - NSU  - Neuro Checks: Q1H  - Sedation: Dexmedetomidine, propofol discontinued  - Pain: None  - Nausea: None  - rCTH at 0400  - Resume AC when able  - PT/OT/SLP    CARDIOVASCULAR:  #Atrial Fibrillation  Assessment:  - ECHO: None on file    Plan:  - Continue to monitor on telemetry  - BP goal: SBP < 160    --> PRN: Labetalol and Hydralazine   - Obtain TTE  - Resume AC when able  - Hold amiodarone until restarted on AC    RESPIRATORY:  #Inability to protect airway 2/2 increased WOB and AMS  Assessment:  - On ventilator  Plan:  - Continuous pulse oximetry   - O2 PRN to maintain SpO2 > 94%, wean as tolerated  - Ventilator bundle while intubated  - CPAP trial in AM    RENAL/:  #Stage 3a CKD  Assessment:  - Baseline BUN/Cr: 26/1.2  Results from last 72 hours   Lab Units 24  1806   BUN mg/dL 27*   CREATININE mg/dL 1.08*       Plan:  - Monitor with daily RFP  - Maintain patterson catheter for: critically ill patient who need accurate urinary output measurements    FEN/GI:  #No active issues  Assessment:  - Last BM: PTA  Plan:  - Monitor and replace electrolytes per protocol  - Diet: NPO   - Bowel Regimen: Miralax QD  - Nutrition recs    ENDOCRINE:  #Hypothyroidism  #Hyperglycemia  Assessment:  Results from last 7 days   Lab Units 24  0017 24  2230 24  1806   POCT GLUCOSE mg/dL 169* 182*  --    GLUCOSE mg/dL  --   --  129*      Plan:  - Accuchecks & ISS Q4H   - Continue home synthroid 75 mcg daily    HEMATOLOGY:  #No active issues  Assessment:  - Baseline Hgb: 12  - Baseline Plts: 175  Results from last 7 days   Lab Units 24  1806   HEMOGLOBIN g/dL 12.4   HEMATOCRIT % 40.6   PLATELETS AUTO x10*3/uL 175     Plan:  - Continue to monitor with daily CBC and Coag panel    INFECTIOUS DISEASE:  #Leukocytosis-likely reactive  Assessment:  Results from last 7 days   Lab Units 24  1806   WBC AUTO x10*3/uL 11.8*    - Temp (24hrs), Av.3 °C  (97.4 °F), Min:36 °C (96.8 °F), Max:36.7 °C (98 °F)     Plan:  - Continue to monitor for s/sx of infection  - Pan culture for temperature > 38.4 C    MUSCULOSKELETAL:  - No acute issues    SKIN:  - No acute issues  - Turns and skin care per NSU protocol    ACCESS:  - PIVs    PROPHYLAXIS:  - DVT Ppx: SCDs  - GI Ppx: Pantoprazole    RESTRAINTS:  I agree with nursing assessment of the patient's need for restraints to protect the patient from injury and facilitate healing. The patient is unable to cooperate with the plan of care and at risk for disrupting critical therapy (i.e., removing medical devices, lines, tubes and/or dressings).  Please see order for specifics. Restraints can be removed when the patient is able to cooperate with plan of care and allow healing to occur, or the medical devices at risk are discontinued by the medical team.     Ivan Desai, APRN-CNP  Neuroscience Intensive Care       Total critical care time of 60 minutes, with > 50% of time spent in direct contact with patient/family for education, counseling and coordination of care.

## 2024-11-08 NOTE — H&P
[facility]  TRAUMA SERVICE - HISTORY AND PHYSICAL / CONSULT     Patient Name: Crystal Rios Echo  MRN: [id number]  Admit Date: 1107  : 1940                      AGE: 84 y.o.                             GENDER: female  ==============================================================================  MECHANISM OF INJURY / CHIEF COMPLAINT:   84 YOF presented to [facility] as a full trauma activation s/p presumed fall from standing with depressed GCS. Reportedly on Eliquis. Last known well from family was ~15:50. On Presentation patient GCS 10 (E4, V2, M6 with R>L). Patient with sonorous respiration with significant oral secretions. At that time decision was made to intubate patient prior to CT scans. CXR obtained after intubation to verify ETT placement. CT imaging notable for acute ischemic stroke; BAT called at 18:56.      LOC (yes/no?): unknown  Anticoagulant / Anti-platelet Rx? (for what dx?): Eliquis  Referring Facility Name (N/A for scene EMR run): n/a     INJURIES:   Acute L MCA ischemic CVA  R frontal cephalohematoma      OTHER MEDICAL PROBLEMS:  Unknown     INCIDENTAL FINDINGS:        ==============================================================================  ADMISSION PLAN OF CARE:  CT pan-scan  BAT called, neurology bedside  Possible mechanical thrombectomy  Labs  EKG  Consultants notified (specialty, provider name, time): Neurology     Dispo: pending imaging results.     Total face to face time spent with patient/family of 20 minutes, with >50% of the time spent discussing plan of care/management, counseling/educating on disease processes, explaining results of diagnostic testing.     Patient discussed with attending, Dr. Carrillo Strong PA-C  Trauma, Critical Care, and Acute Care Surgery  [address]   ==============================================================================  PAST MEDICAL HISTORY:   PMH: unknown  Medical History   No past medical  "history on file.     Last menstrual period: n/a     PSH: unknown  Surgical History   No past surgical history on file.     FH: unknown  Family History   No family history on file.     SOCIAL HISTORY:    Smoking: unknown   Tobacco Use History   Social History          Tobacco Use   Smoking Status Not on file   Smokeless Tobacco Not on file           Alcohol: unknown   Social History          Substance and Sexual Activity   Alcohol Use Not on file        Drug use: unknown     MEDICATIONS: Eliquis  Prior to Admission medications    Not on File      ALLERGIES: Unknown  RX Allergies   Not on File        REVIEW OF SYSTEMS:  Review of Systems  PHYSICAL EXAM:  PRIMARY SURVEY:  Primary Survey  SECONDARY SURVEY/PHYSICAL EXAM:  Physical Exam  IMAGING SUMMARY:  (summary of findings, not a copy of dictation)  CT Head/Face: L MCA ischemic CVA  CT C-Spine:    CT Chest/Abd/Pelvis:    CXR/PXR:    Other(s):       LABS:       Results from last 7 days   Lab Units 11/07/24  1806   WBC AUTO x10*3/uL 11.8*   HEMOGLOBIN g/dL 12.4   HEMATOCRIT % 40.6   PLATELETS AUTO x10*3/uL 175   NEUTROS PCT AUTO % 77.3   LYMPHS PCT AUTO % 14.2   MONOS PCT AUTO % 7.8   EOS PCT AUTO % 0.0           Results from last 7 days   Lab Units 11/07/24  1806   INR   1.2*           Results from last 7 days   Lab Units 11/07/24  1806   SODIUM mmol/L 141   POTASSIUM mmol/L 4.2   CHLORIDE mmol/L 100   CO2 mmol/L 30   BUN mg/dL 27*   CREATININE mg/dL 1.08*   CALCIUM mg/dL 9.4   PROTEIN TOTAL g/dL 7.7   BILIRUBIN TOTAL mg/dL 0.7   ALK PHOS U/L 131   ALT U/L 24   AST U/L 26   GLUCOSE mg/dL 129*           Results from last 7 days   Lab Units 11/07/24  1806   BILIRUBIN TOTAL mg/dL 0.7             I have reviewed all laboratory and imaging results ordered/pertinent for this encounter.         ======================================      Above information was collected by Jorge Strong PA-C. The only edits I have made were removal of Epic's \"wildcards\" to allow this not to be " signed.   I received this patient in signout from Jorge Strong PA-C.     In summary, this patient is an 84 year old female with currently unknown PMHx presenting for a presumed fall. She was intubated in the trauma bay for airway protection due to audible gurgling, sonorous breath sounds, and a decreased GCS of 10 (E4V2M4). No obvious injuries were noted.   Patient received expedited CT and CTA for concerns in the bay for decreased movement of the right extremities most notably in the RLE.     CTA result showed an ischemic stroke in the proximal L MCA, neurology was contacted who evaluate the patient. She was a not a candidate for TPA, due to traumatic injury.       Under my care:   She was evaluated for thrombectomy by neuro-interventional service. She was determined to be a good candidate and was taken emergently to neuro-vascular suite for mechanical thrombectomy.     Plan:   Evaluated by neuro-interventional  Mechanical thrombectomy   No acute injuries 2/2 fall - after discussion with Neurology, will admit to NSICU for further treatment   Due to no acute injury, she does not require further workup or treatment by trauma service - trauma will sign off     Pan-scans were resulted under my care which showed:  CT Head/CTA head: L MCA ischemic CVA, loss of gray-white differentiation involving left KAYLEY/MCA.  Complete occlusion of left MCA M1.  Complete occlusion of proximal left KAYLEY.  Artifactual poor visualization of left vertebral artery.  Kang cranial hemorrhages/fractures.  Right frontal scalp/periorbital hematoma.    CT face: No acute injuries, right frontal scalp/periorbital swelling/hematoma.  No retrobulbar hematoma  CT C-Spine: No acute injuries  CT Chest/Abd/Pelvis: Appropriately placed ET tube.  All minimal groundglass opacity right mid and lower lung (pneumonia/aspiration/contusion.  No other acute injuries, infarct, spinal fractures  CXR: Mild cardiomediastinal enlargement, pacemaker in place.  Mildly  increased lung markings (possibly due to aspiration) consolidation.  ET tube in place 3 cm above [place]      HPI could not be obtained from the patient due to intubation and sedation.   Physical Exam  Constitutional:       Comments: Intubated, sedated   HENT:      Head:      Comments: Right frontal cephalohematoma noted     Ears:      Comments: No blood at ears     Nose: Nose normal.      Mouth/Throat:      Comments: Appears moist around ET tube  Eyes:      Pupils: Pupils are equal, round, and reactive to light.   Cardiovascular:      Rate and Rhythm: Normal rate and regular rhythm.      Pulses: Normal pulses.      Heart sounds: Normal heart sounds. No murmur heard.     No friction rub. No gallop.   Pulmonary:      Effort: Pulmonary effort is normal.      Breath sounds: Normal breath sounds.      Comments: Intubated breath sounds, equal bilaterally  Abdominal:      General: Abdomen is flat.      Palpations: Abdomen is soft.      Comments: Tenderness cannot be assessed   Genitourinary:     Comments: No blood in patterson bag  Musculoskeletal:      Right lower leg: No edema.      Left lower leg: No edema.      Comments: Now intubated and sedated but was initially noted by previous teams to be moving the left extremities more than the right. Most notable in the lower extremities     No obvious deformities  No stepoffs on spine   Skin:     Capillary Refill: Capillary refill takes less than 2 seconds.      Comments: Bruising on BUEs    Neurological:      Comments: Sedated  Sensation/strength cannot be evaluated

## 2024-11-08 NOTE — PROGRESS NOTES
Speech-Language Pathology                 Therapy Communication Note    Patient Name: Crystal Rios Echo  MRN: [id number]  Department: [facility]  Room: 01/01-A  Today's Date: 11/8/2024     Discipline: Speech Language Pathology     Missed Time: Attempt    Comment: Orders received for swallow evaluation. Pt currently intubated. Not appropriate for SLP services at this time. Will complete order and sign off. Please re-consult once pt extubated/medically appropriate.

## 2024-11-08 NOTE — PROCEDURES
Pre-Procedure Verification and Time Out:  Procedure Location: procedure area  HUDDLE - Pre-procedure Verification:  completed  TIME OUT - Final Verification:  completed immediately prior to procedure start  DEBRIEF: completed    Complications:  None; patient tolerated the procedure well.     Disposition: NSU  Condition: stable  Specimens Collected: No specimens collected    General Information:   Anesthesia/ sedation: Non-Anesthesia  Indication(s)/Pre - Procedure Diagnoses: acute stroke  Post-Procedure Diagnosis: same  Procedure Name: Diagnostic Cerebral Angiogram, mechanical thrombectomy  Procedure performed by: Dr.Niko Bledsoe  Assistant(s): Arik  Estimated Blood Loss (mL): 80cc  Specimen: no  Informed Consent: consent obtained and in chart     Last Known Normal:   Access: 8 Fr Sheath R CFA   Closure: Angioseal  Vessels injected: L ICA, L MCA, R CFA  Groin puncture time: 20:25  Findings: proximal L M1 occlusion  Initial Thrombectomy Time: 2110  Additional Thrombectomy Times (Times for all device passes): n/a  Revascularization Time: 21:15  Mechanical Thrombectomy Device: Trevo stent retriever, penumbra aspiration device  TICI Pre: 0  TICI Post: 2B  Post-procedure BP parameters: SBP <160  Full report to follow in PACS.

## 2024-11-08 NOTE — PROGRESS NOTES
Communication Note    Patient Name: Laurie Ozuna  MRN: [id number]  Today's Date: 11/8/2024   Room: 01/01-A    Discipline: Occupational Therapy      Missed Visit: Yes  Missed Visit Reason: Patient placed on medical hold (0840: Per chart review and rounding with fellow, patient is not medically appropriate for OT services at this time.)      11/08/24 at 1:32 PM   Davina Barreto, OT   Rehab Office: [phone]

## 2024-11-09 LAB
ALBUMIN SERPL BCP-MCNC: 3.6 G/DL (ref 3.4–5)
ALBUMIN SERPL BCP-MCNC: 3.6 G/DL (ref 3.4–5)
ANION GAP SERPL CALC-SCNC: 14 MMOL/L (ref 10–20)
ANION GAP SERPL CALC-SCNC: 15 MMOL/L (ref 10–20)
BASOPHILS # BLD AUTO: 0.01 X10*3/UL (ref 0–0.1)
BASOPHILS NFR BLD AUTO: 0.1 %
BUN SERPL-MCNC: 17 MG/DL (ref 6–23)
BUN SERPL-MCNC: 18 MG/DL (ref 6–23)
CA-I BLD-SCNC: 1.11 MMOL/L (ref 1.1–1.33)
CALCIUM SERPL-MCNC: 8.5 MG/DL (ref 8.6–10.6)
CALCIUM SERPL-MCNC: 8.7 MG/DL (ref 8.6–10.6)
CHLORIDE SERPL-SCNC: 105 MMOL/L (ref 98–107)
CHLORIDE SERPL-SCNC: 106 MMOL/L (ref 98–107)
CO2 SERPL-SCNC: 26 MMOL/L (ref 21–32)
CO2 SERPL-SCNC: 28 MMOL/L (ref 21–32)
CREAT SERPL-MCNC: 1.01 MG/DL (ref 0.5–1.05)
CREAT SERPL-MCNC: 1.08 MG/DL (ref 0.5–1.05)
EGFRCR SERPLBLD CKD-EPI 2021: 50 ML/MIN/1.73M*2
EGFRCR SERPLBLD CKD-EPI 2021: 55 ML/MIN/1.73M*2
EOSINOPHIL # BLD AUTO: 0 X10*3/UL (ref 0–0.4)
EOSINOPHIL NFR BLD AUTO: 0 %
ERYTHROCYTE [DISTWIDTH] IN BLOOD BY AUTOMATED COUNT: 15.9 % (ref 11.5–14.5)
GLUCOSE BLD MANUAL STRIP-MCNC: 120 MG/DL (ref 74–99)
GLUCOSE SERPL-MCNC: 124 MG/DL (ref 74–99)
GLUCOSE SERPL-MCNC: 133 MG/DL (ref 74–99)
HCT VFR BLD AUTO: 32.3 % (ref 36–46)
HGB BLD-MCNC: 10.5 G/DL (ref 12–16)
IMM GRANULOCYTES # BLD AUTO: 0.05 X10*3/UL (ref 0–0.5)
IMM GRANULOCYTES NFR BLD AUTO: 0.4 % (ref 0–0.9)
LYMPHOCYTES # BLD AUTO: 1.5 X10*3/UL (ref 0.8–3)
LYMPHOCYTES NFR BLD AUTO: 12.6 %
MAGNESIUM SERPL-MCNC: 2.26 MG/DL (ref 1.6–2.4)
MCH RBC QN AUTO: 27.3 PG (ref 26–34)
MCHC RBC AUTO-ENTMCNC: 32.5 G/DL (ref 32–36)
MCV RBC AUTO: 84 FL (ref 80–100)
MONOCYTES # BLD AUTO: 1.83 X10*3/UL (ref 0.05–0.8)
MONOCYTES NFR BLD AUTO: 15.3 %
NEUTROPHILS # BLD AUTO: 8.56 X10*3/UL (ref 1.6–5.5)
NEUTROPHILS NFR BLD AUTO: 71.6 %
NRBC BLD-RTO: 0 /100 WBCS (ref 0–0)
PHOSPHATE SERPL-MCNC: 2.5 MG/DL (ref 2.5–4.9)
PHOSPHATE SERPL-MCNC: 2.9 MG/DL (ref 2.5–4.9)
PLATELET # BLD AUTO: 144 X10*3/UL (ref 150–450)
POTASSIUM SERPL-SCNC: 3.3 MMOL/L (ref 3.5–5.3)
POTASSIUM SERPL-SCNC: 3.3 MMOL/L (ref 3.5–5.3)
RBC # BLD AUTO: 3.84 X10*6/UL (ref 4–5.2)
SODIUM SERPL-SCNC: 144 MMOL/L (ref 136–145)
SODIUM SERPL-SCNC: 144 MMOL/L (ref 136–145)
WBC # BLD AUTO: 12 X10*3/UL (ref 4.4–11.3)

## 2024-11-09 PROCEDURE — 2500000005 HC RX 250 GENERAL PHARMACY W/O HCPCS: Performed by: PSYCHIATRY & NEUROLOGY

## 2024-11-09 PROCEDURE — 2500000005 HC RX 250 GENERAL PHARMACY W/O HCPCS: Performed by: STUDENT IN AN ORGANIZED HEALTH CARE EDUCATION/TRAINING PROGRAM

## 2024-11-09 PROCEDURE — 94003 VENT MGMT INPAT SUBQ DAY: CPT

## 2024-11-09 PROCEDURE — 82947 ASSAY GLUCOSE BLOOD QUANT: CPT

## 2024-11-09 PROCEDURE — 2500000004 HC RX 250 GENERAL PHARMACY W/ HCPCS (ALT 636 FOR OP/ED)

## 2024-11-09 PROCEDURE — 2500000001 HC RX 250 WO HCPCS SELF ADMINISTERED DRUGS (ALT 637 FOR MEDICARE OP): Performed by: REGISTERED NURSE

## 2024-11-09 PROCEDURE — 83735 ASSAY OF MAGNESIUM: CPT

## 2024-11-09 PROCEDURE — 94640 AIRWAY INHALATION TREATMENT: CPT

## 2024-11-09 PROCEDURE — 2500000002 HC RX 250 W HCPCS SELF ADMINISTERED DRUGS (ALT 637 FOR MEDICARE OP, ALT 636 FOR OP/ED)

## 2024-11-09 PROCEDURE — 80069 RENAL FUNCTION PANEL: CPT

## 2024-11-09 PROCEDURE — 82330 ASSAY OF CALCIUM: CPT

## 2024-11-09 PROCEDURE — 85025 COMPLETE CBC W/AUTO DIFF WBC: CPT

## 2024-11-09 PROCEDURE — 36415 COLL VENOUS BLD VENIPUNCTURE: CPT

## 2024-11-09 PROCEDURE — 2500000001 HC RX 250 WO HCPCS SELF ADMINISTERED DRUGS (ALT 637 FOR MEDICARE OP)

## 2024-11-09 PROCEDURE — 99291 CRITICAL CARE FIRST HOUR: CPT

## 2024-11-09 PROCEDURE — 99233 SBSQ HOSP IP/OBS HIGH 50: CPT

## 2024-11-09 PROCEDURE — 2020000001 HC ICU ROOM DAILY

## 2024-11-09 RX ORDER — LORAZEPAM 2 MG/ML
0.5 INJECTION INTRAMUSCULAR ONCE
Status: COMPLETED | OUTPATIENT
Start: 2024-11-09 | End: 2024-11-09

## 2024-11-09 RX ORDER — LORAZEPAM 2 MG/ML
0.5 INJECTION INTRAMUSCULAR
Status: DISCONTINUED | OUTPATIENT
Start: 2024-11-09 | End: 2024-11-10 | Stop reason: HOSPADM

## 2024-11-09 RX ORDER — POTASSIUM CHLORIDE 1.5 G/1.58G
40 POWDER, FOR SOLUTION ORAL ONCE
Status: COMPLETED | OUTPATIENT
Start: 2024-11-09 | End: 2024-11-09

## 2024-11-09 RX ORDER — HYDROMORPHONE HYDROCHLORIDE 1 MG/ML
0.2 INJECTION, SOLUTION INTRAMUSCULAR; INTRAVENOUS; SUBCUTANEOUS
Status: DISCONTINUED | OUTPATIENT
Start: 2024-11-09 | End: 2024-11-10 | Stop reason: HOSPADM

## 2024-11-09 RX ORDER — HYDROMORPHONE HYDROCHLORIDE 1 MG/ML
0.2 INJECTION, SOLUTION INTRAMUSCULAR; INTRAVENOUS; SUBCUTANEOUS
Status: DISCONTINUED | OUTPATIENT
Start: 2024-11-09 | End: 2024-11-09

## 2024-11-09 RX ORDER — HYDROMORPHONE HYDROCHLORIDE 1 MG/ML
0.5 INJECTION, SOLUTION INTRAMUSCULAR; INTRAVENOUS; SUBCUTANEOUS ONCE
Status: COMPLETED | OUTPATIENT
Start: 2024-11-09 | End: 2024-11-09

## 2024-11-09 RX ORDER — HYDROMORPHONE HYDROCHLORIDE 1 MG/ML
0.4 INJECTION, SOLUTION INTRAMUSCULAR; INTRAVENOUS; SUBCUTANEOUS
Status: DISCONTINUED | OUTPATIENT
Start: 2024-11-09 | End: 2024-11-09

## 2024-11-09 RX ORDER — HYDROMORPHONE HYDROCHLORIDE 1 MG/ML
0.4 INJECTION, SOLUTION INTRAMUSCULAR; INTRAVENOUS; SUBCUTANEOUS
Status: DISCONTINUED | OUTPATIENT
Start: 2024-11-09 | End: 2024-11-10 | Stop reason: HOSPADM

## 2024-11-09 RX ORDER — GLYCOPYRROLATE 0.2 MG/ML
0.2 INJECTION INTRAMUSCULAR; INTRAVENOUS EVERY 4 HOURS PRN
Status: DISCONTINUED | OUTPATIENT
Start: 2024-11-09 | End: 2024-11-10 | Stop reason: HOSPADM

## 2024-11-09 ASSESSMENT — PAIN - FUNCTIONAL ASSESSMENT

## 2024-11-09 ASSESSMENT — RESPIRATORY DISTRESS OBSERVATION SCALE (RDOS)
ACCESSORY MUSCLE RISE IN CLAVICLE DURING INSPIRATION: 0 - NONE
ACCESSORY MUSCLE RISE IN CLAVICLE DURING INSPIRATION: 0 - NONE
INVOLUNTARY NASAL FLARING: 0 - NONE
RESPIRATORY RATE PER MINUTE: 1 - 19-30 BREATHS
RDOS TOTAL SCORE: 2
HEART RATE PER MINUTE: 1 - 90-109 BEATS
PARADOXICAL BREATHING PATTERN: 0 - NONE
RESPIRATORY RATE PER MINUTE: 1 - 19-30 BREATHS
HEART RATE PER MINUTE: 1 - 90-109 BEATS
ACCESSORY MUSCLE RISE IN CLAVICLE DURING INSPIRATION: 1 - SLIGHT RISE
INVOLUNTARY NASAL FLARING: 0 - NONE
ACCESSORY MUSCLE RISE IN CLAVICLE DURING INSPIRATION: 1 - SLIGHT RISE
LOOK OF FEAR: 0 - NONE
INVOLUNTARY NASAL FLARING: 0 - NONE
GRUNTING AT END OF EXPIRATION: 0 - NONE
RESTLESS NONPURPOSEFUL MOVEMENTS: 0 - NONE
PARADOXICAL BREATHING PATTERN: 0 - NONE
INVOLUNTARY NASAL FLARING: 0 - NONE
GRUNTING AT END OF EXPIRATION: 0 - NONE
PARADOXICAL BREATHING PATTERN: 0 - NONE
GRUNTING AT END OF EXPIRATION: 0 - NONE
LOOK OF FEAR: 0 - NONE
RESTLESS NONPURPOSEFUL MOVEMENTS: 0 - NONE
RESPIRATORY RATE PER MINUTE: 1 - 19-30 BREATHS
GRUNTING AT END OF EXPIRATION: 0 - NONE
LOOK OF FEAR: 0 - NONE
HEART RATE PER MINUTE: 1 - 90-109 BEATS
INVOLUNTARY NASAL FLARING: 0 - NONE
RESPIRATORY RATE PER MINUTE: 1 - 19-30 BREATHS
HEART RATE PER MINUTE: 2 - >109 BEATS
LOOK OF FEAR: 0 - NONE
RESTLESS NONPURPOSEFUL MOVEMENTS: 0 - NONE
RDOS TOTAL SCORE: 3
RDOS TOTAL SCORE: 3
RESTLESS NONPURPOSEFUL MOVEMENTS: 0 - NONE
PARADOXICAL BREATHING PATTERN: 0 - NONE
LOOK OF FEAR: 0 - NONE
GRUNTING AT END OF EXPIRATION: 0 - NONE
PARADOXICAL BREATHING PATTERN: 0 - NONE
ACCESSORY MUSCLE RISE IN CLAVICLE DURING INSPIRATION: 0 - NONE
RESTLESS NONPURPOSEFUL MOVEMENTS: 0 - NONE
RESPIRATORY RATE PER MINUTE: 1 - 19-30 BREATHS
RDOS TOTAL SCORE: 3
HEART RATE PER MINUTE: 2 - >109 BEATS
RDOS TOTAL SCORE: 3

## 2024-11-09 NOTE — SIGNIFICANT EVENT
Update in clinical status:     Repeat CT head at 22:14 11/8 shows worsening MLS (5mm--> 1.3cm) from CT head on 11/8; 05:13. Exam significant for dilated but reactive left pupil (1mm compared to R), VOR intact, intubated, with RUE extension on noxious stimuli. LUE localizes to noxious stiulai. Spontaneous movement in bl LE.     Informed family (Elena Ozuna - jeferson) about change in imaging and clinical exam. Explained how patient expected to have worsening edema and MLS in next days with a poor prognosis. Family expressed understanding.     Luz Nagy MD  Neurology PGY-3

## 2024-11-09 NOTE — PROGRESS NOTES
Physical Therapy                 Therapy Communication Note    Patient Name: Laurie Ozuna  MRN: [id number]  Department: [facility]  Room: 01/01-A  Today's Date: 11/9/2024     Discipline: Physical Therapy    Missed Visit Reason: Missed Visit Reason:  (ongoing GOC discussion. will hold PT.)    Missed Time: Attempt    Comment:

## 2024-11-09 NOTE — PROGRESS NOTES
NEURO CRITICAL CARE DAILY PROGRESS NOTE     Subjective    CTH at 22:14 11/8 showed worsening MLS 5mm -> 1.3cm from 11/8 0513. Exam significant significant for dilated and unreactive L pupil, R pupil fixed. Family informed overnight regarding worsening exam change    Code status discussion: Spoke to sister Elena on the phone and family was at bedside this morning. Family was understanding of poor prognosis and that there is no surgical treatment available for her at this time given her age and co-morbidities. They would like the code status to be DNR-CC with the understanding that she would likely soon pass after extubation. They would like to wait until other family members are able to gather at bedside prior to extubation.      Objective   VITALS (24H):  Temp:  [36.7 °C (98.1 °F)-36.9 °C (98.4 °F)] 36.7 °C (98.1 °F)  Heart Rate:  [] 80  Resp:  [12-31] 14  BP: (118-159)/(41-74) 141/61  FiO2 (%):  [30 %] 30 %  INTAKE/OUTPUT:  Intake/Output Summary (Last 24 hours) at 11/9/2024 0756  Last data filed at 11/9/2024 0700  Gross per 24 hour   Intake 1261.25 ml   Output 1063 ml   Net 198.25 ml     VENT SETTINGS:  Vent Mode: Volume control/assist control  FiO2 (%):  [30 %] 30 %  S RR:  [12] 12  S VT:  [400 mL] 400 mL  PEEP/CPAP (cm H2O):  [5 cm H20] 5 cm H20  MAP (cm H2O):  [8-9.1] 8     PHYSICAL EXAM:  NEURO:  - Off precedex   - Eyes closed  - Gaze midline  - LUE and RUE withdraws to nox stim  - BLE spontaneous   CV:  -irregularly irregular   RESP:  - intubated  - Oxygen: Ventilator  :  - Indwelling patterson catheter in place  GI:  - Abdomen NT/ND, soft  SKIN:  - Intact  - right frontal cephalohematoma    1a. LOC  1b. Month and Age  1c. Blink and Make fist  2 Best Gaze  3. Visual  4. Facial Palsy  5A. Motor - L Arm  5B. Motor - R Arm  6A. Motor - L Leg  6B. Motor - R Leg  7. Limb Ataxia  8. Sensory Loss  9. Best Language  10. Dysarthria  11. Extinction and Inattention   Total NIHSS  2  2  2  1  0  0  2  3  3  3  0  0  0  UN  0  18         MEDICATIONS:  Scheduled: PRN: Continuous:   [Held by provider] amiodarone, 200 mg, oral, Daily  atorvastatin, 40 mg, oral, Nightly  enoxaparin, 40 mg, subcutaneous, q24h  insulin lispro, 0-5 Units, subcutaneous, q4h  levothyroxine, 75 mcg, oral, Daily  metoprolol succinate XL, 25 mg, oral, Daily  pantoprazole, 40 mg, oral, Daily before breakfast   Or  pantoprazole, 40 mg, intravenous, Daily before breakfast  perflutren protein A microsphere, 0.5 mL, intravenous, Once in imaging  polyethylene glycol, 17 g, oral, Daily  sodium chloride, 3 mL, nebulization, q6h MEGAN  sulfur hexafluoride microsphr, 2 mL, intravenous, Once in imaging     PRN medications: calcium gluconate, calcium gluconate, dextrose, dextrose, glucagon, glucagon, hydrALAZINE **FOLLOWED BY** hydrALAZINE, labetaloL, magnesium sulfate, magnesium sulfate, oxygen, oxygen, potassium chloride CR **OR** potassium chloride, potassium chloride CR **OR** potassium chloride, potassium chloride dexmedeTOMIDine, 0.1-1.5 mcg/kg/hr  lactated Ringer's, 75 mL/hr, Last Rate: 75 mL/hr (11/09/24 0500)         LAB RESULTS:  Results from last 72 hours   Lab Units 11/09/24  0303 11/08/24  0108 11/07/24  1806   WBC AUTO x10*3/uL 12.0* 17.0* 11.8*   HEMOGLOBIN g/dL 10.5* 11.7* 12.4   HEMATOCRIT % 32.3* 37.0 40.6   PLATELETS AUTO x10*3/uL 144* 219 175        Results from last 72 hours   Lab Units 11/09/24  0303 11/08/24  2326 11/08/24  0108 11/07/24  1806   SODIUM mmol/L 144 144 141 141   POTASSIUM mmol/L 3.3* 3.3* 4.4 4.2   CHLORIDE mmol/L 106 105 100 100   CO2 mmol/L 26 28 28 30   BUN mg/dL 17 18 24* 27*   CREATININE mg/dL 1.01 1.08* 1.00 1.08*   MAGNESIUM mg/dL 2.26  --  2.28 2.53*   PHOSPHORUS mg/dL 2.5 2.9 4.7  --       Assessment/Plan    84 y.o. female with a PMHx of afib (s/p ablation, ppm, on eliquis lasts dose 11/7), HTN, HLD admitted on 11/7 for acute L MCA stroke. Patient was in her usual state of health and was  found down at home. On arrival to the ED was moving her L > R but was unable to maintain secretions and was intubated. CTH/CTA showed L M1 occlusion, ASPECTS 3, no s/p MT TICI 2B. No TNK d/t OOW. NIH Pre MT (s/p rocuronium 28), post MT (26), improved to 19 11/8 AM.     NEURO:  #Acute ischemic L MCA stroke, TICI 2b  Assessment:  - Neurologically: see exam above  Plan:  - NSU, Neuro Checks: Q1H,  Sedation: Dexmedetomidine,  Pain: None   Nausea: None , PT/OT/SLP  - CTH 11/8/24: 12.2mm shift, no HC  - Started DVT prophylaxis on 11/8  - Hold Eliquis      CARDIOVASCULAR:  #Atrial Fibrillation s/p AVNRT 2006, pacemaker 2018  #[place] Cardiomyopathy    Assessment:  - TTE 11/8 - LVEF 60%, no PFO, hypokinetic apical lateral segment  (new comapred to TTE 09/23'')   Plan:  - Continue to monitor on telemetry  - BP goal: SBP < 160    --> PRN: Labetalol and Hydralazine   - Obtain TTE  - Resume AC when able  - Hold amiodarone until restarted on AC    RESPIRATORY:  #Inability to protect airway 2/2 increased WOB and AMS  Assessment:  - On ventilator  Plan:  - Continuous pulse oximetry   - O2 PRN to maintain SpO2 > 94%, wean as tolerated  - Ventilator bundle while intubated  - CPAP trial in AM    RENAL/:  #Stage 3a CKD  Assessment:  - Baseline BUN/Cr: 26/1.2  Results from last 72 hours   Lab Units 11/09/24  0303 11/08/24  2326   BUN mg/dL 17 18   CREATININE mg/dL 1.01 1.08*       Plan:  - Monitor with daily RFP  - Maintain patterson catheter for: critically ill patient who need accurate urinary output measurements    FEN/GI:  #No active issues  Assessment:  -  Last BM Date:  (PTA)    Plan:  - Monitor and replace electrolytes per protocol  - Diet: NPO   - Bowel Regimen: Miralax QD  - Nutrition recs    ENDOCRINE:  #Hypothyroidism  #Hyperglycemia  Assessment:  Results from last 7 days   Lab Units 11/09/24  0724 11/09/24  0303 11/08/24  2326 11/08/24  1935 11/08/24  1624 11/08/24  1210 11/08/24  0752 11/08/24  0334   POCT GLUCOSE mg/dL  120*  --   --  143* 133* 157* 153* 149*   GLUCOSE mg/dL  --  124* 133*  --   --   --   --   --       Plan:  - Accuchecks & ISS Q4H   - Continue home synthroid 75 mcg daily    HEMATOLOGY:  #No active issues  Assessment:  - Baseline Hgb: 12  - Baseline Plts: 175  Results from last 7 days   Lab Units 24  0303 24  0108   HEMOGLOBIN g/dL 10.5* 11.7*   HEMATOCRIT % 32.3* 37.0   PLATELETS AUTO x10*3/uL 144* 219     Plan:  - Continue to monitor with daily CBC and Coag panel    INFECTIOUS DISEASE:  #Leukocytosis-likely reactive  Assessment:  Results from last 7 days   Lab Units 24  0303 24  0108   WBC AUTO x10*3/uL 12.0* 17.0*    - Temp (24hrs), Av.8 °C (98.2 °F), Min:36.7 °C (98.1 °F), Max:36.9 °C (98.4 °F)     Plan:  - Continue to monitor for s/sx of infection  - Pan culture for temperature > 38.4 C    MUSCULOSKELETAL:  - No acute issues    SKIN:  - No acute issues  - Turns and skin care per NSU protocol    ACCESS:  - PIVs    PROPHYLAXIS:  - DVT Ppx: SCDs, Lovenox   - GI Ppx: Pantoprazole    RESTRAINTS:  I agree with nursing assessment of the patient's need for restraints to protect the patient from injury and facilitate healing. The patient is unable to cooperate with the plan of care and at risk for disrupting critical therapy (i.e., removing medical devices, lines, tubes and/or dressings).  Please see order for specifics. Restraints can be removed when the patient is able to cooperate with plan of care and allow healing to occur, or the medical devices at risk are discontinued by the medical team.     Maritza Rushing MD  Neurology, PGY-2

## 2024-11-09 NOTE — PROGRESS NOTES
"Laurie Ozuna is a 84 y.o. female on day 2 of admission presenting with Acute ischemic left MCA stroke (Multi).    Subjective   Repeat CTH from overnight showed worsening midline shift. Examination notable for fixed and dilated left pupil. Ongoing GOC discussion with family.          Objective     Last Recorded Vitals  Blood pressure 141/61, pulse 80, temperature 36.7 °C (98.1 °F), temperature source Tympanic, resp. rate 14, height 1.626 m (5' 4.02\"), weight 94.8 kg (209 lb), SpO2 100%.    Physical Exam  Neurological Exam  NEUROLOGICAL:  A limited neurological examination was performed d/t patient's altered level of consciousness. Currently intubated with no sedation. Not following commands. No spontaneous movements.    Cranial Nerves & Reflexes:  Eyes are closed. When eyelids are opened, pupils are aligned and gaze remains stationary in primary position midline Eyes remain closed to nox stim    Pupillary (II;III): Left pupil is fixed and dilated. Right pupil   Menace (II;VII): no BTT detected    Sensorimotor:   Bilateral withdrawal in the upper extremities.  Bilateral triple flexion in lower extremities.    Relevant Results    NIH Stroke Scale  1A. Level of Consciousness: Requires Repeated Stimulation to Arouse or Responds to Pain  1B. Ask Month and Age: No Questions Right  1C. Blink Eyes & Squeeze Hands: Performs 0 Tasks  2. Best Gaze: Partial Gaze Palsy  3. Visual: No Visual Loss  4. Facial Palsy: Normal Symmetrical Movements  5A. Motor - Left Arm: Some Effort Against Gravity  5B. Motor - Right Arm: No Effort Against Gravity  6A. Motor - Left Leg: No Effort Against Gravity  6B. Motor - Right Leg: No Effort Against [place]  7. Limb Ataxia: Absent  8. Sensory Loss: Normal  9. Best Language: No Aphasia  10. Dysarthria: Intubated  11. Extinction and Inattention: No Abnormality  NIH Stroke Scale: 18           [place] Coma Scale  Best Eye Response: None  Best Verbal Response: None  Best Motor Response: Withdraws to " pain  [place] Coma Scale Score: 6                                Assessment/Plan   This patient currently has cardiac telemetry ordered; if you would like to modify or discontinue the telemetry order, click here to go to the orders activity to modify/discontinue the order.  Assessment & Plan  Acute ischemic left MCA stroke (Multi)    Laurie Ozuna is a 84 y.o. Right-handed female with history of afib s/p pacemaker and on eliquis, hypertension, hyperlipidemia, low back pain receiving transforaminal epidural (TFE) steroid injections (L5-S1) presenting after being found down at home by family members who called EMS. Patient was intubated in ED because of respiratory decline. CT head and CTA showed L M1 occlusion with ASPECTS score of 3. NIHSS 28 pre-thrombectomy. TICI 2B. And post thrombectomy NIHSS of 26.     Stroke Classification:  Type: Ischemic stroke  Subtype/etiology: cardioembolic  Vessels involved: L M1  Neurological manifestations:  Pre-Intervention Deficits: NIHSS 28 (LOC3, LOCq/c 2/2, Gaze 1, vis 3, BUE/BLE 4/4, sens 2, lang 3, dysarth 2)  Post-Intervention Deficits: NIHSS 26 (LOC3, LOCq/c 2/2, vis 3, LUE2, RUE 3, BLE 3, sens2, lang3, dysarth2)  Pre-stroke mRS: 0  Initial treatment: Angio  Anti-platelets or Anti-coagulation management: N/A  Vascular Risk Factors: off eliquis  Antiplatelet/antithrombotic plan for stroke prevention: anticoagulation to be discussed  VTE prophylaxis: post-thrombectomy     Vascular Risk Factor modification goals:  Blood pressure goals: avoid hypotension SBP <100 that could worsen cerebral perfusion, Ischemic stroke- early permissive hypertension SBP < 220 mmHg with cautious inpatient lowering  Lipid Goals: education on healthy diet and statin therapy to maintain or achieve goal LDL-cholesterol < 70mg  Glucose Goals: early treatment of hyperglycemia to goal glucose 140-180 mg/dl with long-term goal A1c < 7%   Smoking Cessation and Education  Assessment for Rehabilitation needs    Patient and family education on signs and symptoms of stroke, calling 911, healthy strategies for stroke prevention.       #Left M1 occlusion, ischemic stroke  Eliquis held for the past three days for pain medicine injection. Last known well 3:50 PM when family member called patient. Neurology team saw patient at 7 PM after being paged for left M1 occlusion on CTA. Aspects score 3. NIHSS 28. Mechanical thrombectomy was performed and was TICI 2b. With post NIHSS 26  - BP goal <160  - A1C 6.4, LDL 65  - Atorvastatin 40  - telemetry  - TTE: EF 60%, normal LA size, negative bubble  - MRI Brain w/o contrast pending  - repeat CTH with worsening midline shift and new dilated fixed left pupil  - Continue to hold home ELiquis  - PT/OT/SLP    Harmeet Zuniga,   Neurology, PGY-2

## 2024-11-09 NOTE — PROGRESS NOTES
Occupational Therapy                 Therapy Communication Note    Patient Name: Laurie Ozuna  MRN: [id number]  Department: [facility]  Room: 01/01-A  Today's Date: 11/9/2024     Discipline: Occupational Therapy    Missed Visit Reason: Missed Visit Reason:  (RASS -4, Ongoing GOC discussion, not appropriate for OT services at this time.)    Missed Time: Attempt

## 2024-11-10 VITALS
DIASTOLIC BLOOD PRESSURE: 48 MMHG | OXYGEN SATURATION: 88 % | WEIGHT: 209 LBS | SYSTOLIC BLOOD PRESSURE: 149 MMHG | HEART RATE: 98 BPM | TEMPERATURE: 99.5 F | HEIGHT: 64 IN | BODY MASS INDEX: 35.68 KG/M2 | RESPIRATION RATE: 19 BRPM

## 2024-11-10 PROCEDURE — 2500000004 HC RX 250 GENERAL PHARMACY W/ HCPCS (ALT 636 FOR OP/ED)

## 2024-11-10 ASSESSMENT — RESPIRATORY DISTRESS OBSERVATION SCALE (RDOS)
INVOLUNTARY NASAL FLARING: 0 - NONE
GRUNTING AT END OF EXPIRATION: 0 - NONE
GRUNTING AT END OF EXPIRATION: 0 - NONE
RESPIRATORY RATE PER MINUTE: 1 - 19-30 BREATHS
RESTLESS NONPURPOSEFUL MOVEMENTS: 0 - NONE
RESPIRATORY RATE PER MINUTE: 1 - 19-30 BREATHS
LOOK OF FEAR: 0 - NONE
HEART RATE PER MINUTE: 1 - 90-109 BEATS
RESTLESS NONPURPOSEFUL MOVEMENTS: 0 - NONE
RDOS TOTAL SCORE: 3
LOOK OF FEAR: 0 - NONE
PARADOXICAL BREATHING PATTERN: 0 - NONE
PARADOXICAL BREATHING PATTERN: 0 - NONE
RDOS TOTAL SCORE: 2
ACCESSORY MUSCLE RISE IN CLAVICLE DURING INSPIRATION: 0 - NONE
RESTLESS NONPURPOSEFUL MOVEMENTS: 0 - NONE
ACCESSORY MUSCLE RISE IN CLAVICLE DURING INSPIRATION: 0 - NONE
PARADOXICAL BREATHING PATTERN: 0 - NONE
PARADOXICAL BREATHING PATTERN: 0 - NONE
RESPIRATORY RATE PER MINUTE: 1 - 19-30 BREATHS
RDOS TOTAL SCORE: 2
RESPIRATORY RATE PER MINUTE: 1 - 19-30 BREATHS
GRUNTING AT END OF EXPIRATION: 0 - NONE
PARADOXICAL BREATHING PATTERN: 0 - NONE
RDOS TOTAL SCORE: 2
HEART RATE PER MINUTE: 1 - 90-109 BEATS
LOOK OF FEAR: 0 - NONE
LOOK OF FEAR: 0 - NONE
ACCESSORY MUSCLE RISE IN CLAVICLE DURING INSPIRATION: 0 - NONE
HEART RATE PER MINUTE: 1 - 90-109 BEATS
INVOLUNTARY NASAL FLARING: 0 - NONE
RESTLESS NONPURPOSEFUL MOVEMENTS: 0 - NONE
INVOLUNTARY NASAL FLARING: 0 - NONE
INVOLUNTARY NASAL FLARING: 0 - NONE
LOOK OF FEAR: 0 - NONE
GRUNTING AT END OF EXPIRATION: 0 - NONE
HEART RATE PER MINUTE: 0 - <90 BEATS
INVOLUNTARY NASAL FLARING: 0 - NONE
RDOS TOTAL SCORE: 3
GRUNTING AT END OF EXPIRATION: 2 - PRESENT
RESTLESS NONPURPOSEFUL MOVEMENTS: 0 - NONE
ACCESSORY MUSCLE RISE IN CLAVICLE DURING INSPIRATION: 1 - SLIGHT RISE
RESPIRATORY RATE PER MINUTE: 1 - 19-30 BREATHS
ACCESSORY MUSCLE RISE IN CLAVICLE DURING INSPIRATION: 0 - NONE
HEART RATE PER MINUTE: 1 - 90-109 BEATS

## 2024-11-10 ASSESSMENT — PAIN - FUNCTIONAL ASSESSMENT
PAIN_FUNCTIONAL_ASSESSMENT: CPOT (CRITICAL CARE PAIN OBSERVATION TOOL)

## 2024-11-10 NOTE — DISCHARGE SUMMARY
Discharge Diagnosis  Acute ischemic left MCA stroke (Multi)    Problem List  Assessment & Plan  Acute ischemic left MCA stroke (Multi)      No MRI head results found for the past 14 days  CT head wo IV contrast    Result Date: 11/9/2024  Interpreted By:  Darwin Arora and Hooper Grayson STUDY: [id number] CT HEAD WO IV CONTRAST   INDICATION: Signs/Symptoms:stability scan   COMPARISON: CT of the head obtained November 8, 2024   ACCESSION NUMBER(S): [id number]   ORDERING CLINICIAN: DEEDEE WARREN   TECHNIQUE: Noncontrast helical CT of the head was performed. Multiplanar reformations in bone and soft tissue algorithm were provided.   FINDINGS: Endotracheal and enteric hardware is incompletely assessed on this exam.   Large left hemispheric subacute infarct component involving the left KAYLEY and MCA vascular territories, similar in extent/distribution to comparison examination with redemonstrated elements of petechial hemorrhage throughout the infarct component. Markedly worsened left-to-right midline shift, now measuring 1.4 cm (axial series 204, image 39) versus 0.6 cm on the comparison exam. Question possible early infarct of the paramedian aspect of the right frontal lobe, concerning for additional right KAYLEY distribution infarct.   Left temporal encroachment on the midbrain is noted with asymmetric widening of the left ambient cistern. The quadrigeminal and ambient cisterns are still well visualized.   Unchanged remote right temporal and occipital PCA distribution infarct.   Similar focal asymmetric CSF density prominence within the left posterior fossa along the anterolateral margin of the left cerebellum that may represent a small arachnoid cyst (series 204, image 69).   No extra-axial hemorrhage.   There is worsening mass effect on the 3rd and left lateral ventricle with partial effacement. There is ex vacuo dilatation of portions of the right lateral ventricle. There is no definite ventricular entrapment.    Normal CT appearances of the paranasal sinuses. Status post bilateral lens replacements. No mastoid effusion. No acute or aggressive appearing calvarial lesion. A right frontal scalp hematoma appears stable.       1. Evolving subacute large left KAYLEY and MCA territory infarct with hemorrhagic transformation and worsening left-to-right midline shift measuring 1.4 cm versus 0.6 cm on the comparison exam. There is also new early left uncal herniation. 2. Worsening mass effect on the ventricular collecting system with no definite ventricular entrapment at this time. 3. Remote right PCA territory infarct.   I personally reviewed the images/study and I agree with the findings as stated. This study was interpreted at [place], [place].   MACRO: Ariel Yun discussed the significance and urgency of this critical finding by secure chat with  Dr. Rushing  on 11/9/2024 at 9:50 am. (**-RCF-**) Findings:  See findings.     Signed by: Darwin Arora 11/9/2024 2:06 PM Dictation workstation:   [id number]    CT head wo IV contrast    Result Date: 11/8/2024  Interpreted By:  Darwin Arora and Ritchie Brandon STUDY: CT HEAD WO IV CONTRAST;  11/8/2024 5:13 am   INDICATION: Signs/Symptoms:post MT stability scan   COMPARISON: Mechanical thrombectomy performed 11/07/2024. CT head dated 11/07/2024.   ACCESSION NUMBER(S): [id number]   ORDERING CLINICIAN: THOMPSON BETANCOURT   TECHNIQUE: Axial noncontrast CT images of head with coronal and sagittal reconstructed images.   FINDINGS: As compared with post thrombectomy imaging there is multifocal hyperdensity within the left cerebral hemisphere that may reflect an element of contrast staining versus hemorrhagic transformation. There is involvement of the posterior margin of the superior left frontal lobe as well as extensive involvement of the left parietal lobe as well as portions of the left temporal lobe, left insular cortex, and left basal ganglia  structures. Additionally, there is increasing confluence hypodensity within the left frontal lobe including involving the parasagittal aspect, likely reflecting evolving left KAYLEY and MCA territory infarct components. There is also hypodensity within the left caudate head as well as patchy involvement of the left anterior basal ganglia regions. There is development of sulcal effacement and mass effect with up to 6 mm left-to-right midline shift as measured at the septum pellucidum at the level of the foramen of Monro. There is also mass effect on the left lateral ventricle and 3rd ventricle. There is no ventricular entrapment or obstructive hydrocephalus. Basilar cisterns remain patent. These findings are new as compared with preoperative CT head examination.   No evidence of intraventricular hemorrhage or obstructive hydrocephalus/ventricular entrapment.   Remote right PCA territory infarct with unchanged encephalomalacia involving the inferior medial right temporal and occipital lobes.   There is a stable hematoma along the right frontal scalp which measures 1.4 cm in maximum diameter, previously measuring 1.5 cm on analogous slice.   Mild mucosal thickening is present in the right sphenoid sinus, otherwise the paranasal sinuses and mastoid air cells are clear. Nasopharyngeal secretions noted.   The visualized portions of the orbits/globes are intact.       Post mechanical thrombectomy stability scan with evolving large territory acute to subacute left MCA and KAYLEY infarction. There is increasing hypodensity within the left KAYLEY and anterior left MCA vascular territory as described above with hyperdense elements throughout the left MCA region as on initial post thrombectomy images. There is mildly worsening mass effect with up to 6 mm midline shift and mass effect on the left lateral and 3rd ventricle without ventricular entrapment/hydrocephalus.   I personally reviewed the images/study and I agree with the findings  as stated by resident John Forman. This study was interpreted at [facility], [place], [place].   MACRO: John Forman discussed the significance and urgency of this critical finding by telephone with  ALEKSANDR TAPIA on 11/8/2024 at 5:37 am.  (**-RCF-**) Findings:  See findings.     Signed by: Darwin Arora 11/8/2024 8:36 AM Dictation workstation:   [id number]   Transthoracic Echo (TTE) Complete    Result Date: 11/8/2024   [facility], [address] [place] [address]                Tel [phone] and Fax [phone] TRANSTHORACIC ECHOCARDIOGRAM REPORT  Patient Name:       THUAN Loera Physician:    [id number] Zoraida Mai MD Study Date:         11/8/2024           Ordering Provider:    [id number] THOMPSON BETANCOURT MRN/PID:            [id number]            Fellow: Accession#:         [id number]        Nurse: Date of Birth/Age:  1940 / 84      Sonographer:          Annalisa Price RDCS                     years Gender assigned at  F                   Additional Staff: Birth: Height:             162.56 cm           Admit Date:           11/7/2024 Weight:             94.80 kg            Admission Status:     Inpatient - STAT BSA / BMI:          1.99 m2 / 35.88     Encounter#:           [id number]                     kg/m2 Blood Pressure:     137/49 mmHg         Department Location:  [facility] Study Type:    TRANSTHORACIC ECHO (TTE) COMPLETE Diagnosis/ICD: Cerebral infarction due to unspecified occlusion or stenosis of                left middle cerebral artery-I63.512 Indication:    Acute ischemic left MCA stroke CPT Code:      Echo Complete w Full Doppler-[id number] Patient History: Pertinent History: HTN. stroke. Study Detail: The following Echo studies were performed: 2D, M-Mode, Doppler and               color flow.  Technically challenging study due to patient lying in               supine position and body habitus. The patient is intubated.               Agitated saline used as a contrast agent for intraseptal flow               evaluation and Definity used as a contrast agent for endocardial               border definition. Total contrast used for this procedure was 3.0               mL via IV push.  PHYSICIAN INTERPRETATION: Left Ventricle: Left ventricular ejection fraction is normal, by visual estimate at 60%. The left ventricular cavity size is normal. There is normal septal thickness. Spectral Doppler shows an abnormal pattern of left ventricular diastolic filling with an elevated left atrial pressure. LV Wall Scoring: The apical septal segment, apical lateral segment, apical anterior segment, and apex are akinetic. The apical inferior segment is hypokinetic. Left Atrium: The left atrium is normal in size. There is no evidence of a patent foramen ovale. A bubble study using agitated saline was performed. Right Ventricle: The right ventricle is upper limits of normal in size. There is mildly reduced right ventricular systolic function. A device is visualized in the right ventricle. Right Atrium: The right atrium is mildly dilated. There is a device visualized in the right atrium. Aortic Valve: The aortic valve is probably trileaflet. There is trace aortic valve regurgitation. The peak instantaneous gradient of the aortic valve is 7 mmHg. Mitral Valve: The mitral valve is normal in structure. The peak instantaneous gradient of the mitral valve is 6 mmHg. There is trace mitral valve regurgitation. Tricuspid Valve: The tricuspid valve is structurally normal. There is mild tricuspid regurgitation. The Doppler estimated RVSP is mild to moderately elevated at 51.0 mmHg. Pulmonic Valve: The pulmonic valve is not well visualized. There is physiologic pulmonic valve regurgitation. Pericardium: Trivial pericardial effusion. Aorta:  The aortic root is normal. There is no dilatation of the aortic root. Systemic Veins: The inferior vena cava appears dilated, IVC inspiratory collapse is absent. In comparison to the previous echocardiogram(s): There are no prior studies on this patient for comparison purposes.  CONCLUSIONS:  1. Multiple segmental abnormalities exist. See findings.  2. Left ventricular ejection fraction is normal, by visual estimate at 60%.  3. Spectral Doppler shows an abnormal pattern of left ventricular diastolic filling with an elevated left atrial pressure.  4. There is mildly reduced right ventricular systolic function.  5. Mild to moderately elevated right ventricular systolic pressure.  6. There is no evidence of a patent foramen ovale.  7. The apical septal segment , apical lateral segment , apical anterior segment and apex are akinetic. The apical inferior segment is hypokinetic. RECOMMENDATIONS: Utilizing an FDA cleared automated machine learning algorithm ([employer] Medical Heart Failure by [employer]), the analysis of the apical 4-chamber echocardiogram suggests the presence of heart failure with preserved ejection fraction (HFpEF)*. Clinical correlation looking for additional heart failure signs and symptoms is recommended, as a definite diagnosis of heart failure cannot be made by imaging alone. *Per ACC/AHA/HFSA universal diagnosis of heart failure, HFpEF is defined as 1) signs and symptoms leading to clinical diagnosis of heart failure, 2) an ejection fraction of at least 50%, and 3) evidence of elevated intra-cardiac filling pressures by echocardiography, BNP elevation, or catheterization.  QUANTITATIVE DATA SUMMARY:  2D MEASUREMENTS:          Normal Ranges: Ao Root d:       3.20 cm  (2.0-3.7cm) LAs:             4.70 cm  (2.7-4.0cm) IVSd:            0.80 cm  (0.6-1.1cm) LVPWd:           0.50 cm  (0.6-1.1cm) LVIDd:           4.70 cm  (3.9-5.9cm) LVIDs:           3.20 cm LV Mass Index:   47 g/m2 LVEDV Index:     80 ml/m2 LV % FS           31.9 %  LA VOLUME:                    Normal Ranges: LA Vol A4C:        41.9 ml    (22+/-6mL/m2) LA Vol A2C:        57.0 ml LA Vol BP:         52.9 ml LA Vol Index A4C:  21.0ml/m2 LA Vol Index A2C:  28.6 ml/m2 LA Vol Index BP:   26.5 ml/m2 LA Area A4C:       18.1 cm2 LA Area A2C:       19.5 cm2 LA Major Axis A4C: 6.6 cm LA Major Axis A2C: 5.7 cm  RA VOLUME BY A/L METHOD:          Normal Ranges: RA Area A4C:             23.7 cm2  M-MODE MEASUREMENTS:         Normal Ranges: AoV Exc:             1.80 cm (1.5-2.5cm)  AORTA MEASUREMENTS:         Normal Ranges: AoV Exc:            1.80 cm (1.5-2.5cm) Asc Ao, d:          3.20 cm (2.1-3.4cm) Ao Arch:            3.30 cm (2.0-3.6cm)  LV SYSTOLIC FUNCTION BY 2D PLANIMETRY (MOD):                      Normal Ranges: EF-A4C View:    59 % (>=55%) EF-A2C View:    69 % EF-Biplane:     64 % EF-Visual:      60 % LV EF Reported: 60 %  LV DIASTOLIC FUNCTION:           Normal Ranges: MV Peak E:             1.11 m/s  (0.7-1.2 m/s) MV e'                  0.081 m/s (>8.0) MV lateral e'          0.10 m/s MV medial e'           0.06 m/s E/e' Ratio:            13.72     (<8.0) MV DT:                 178 msec  (150-240 msec)  MITRAL VALVE:          Normal Ranges: MV Vmax:      1.23 m/s (<=1.3m/s) MV peak P.0 mmHg (<5mmHg) MV mean P.5 mmHg (<2mmHg) MV DT:        178 msec (150-240msec)  AORTIC VALVE:           Normal Ranges: AoV Vmax:      1.36 m/s (<=1.7m/s) AoV Peak P.4 mmHg (<20mmHg) LVOT Max Nnamdi:  0.96 m/s (<=1.1m/s) LVOT VTI:      16.80 cm LVOT Diameter: 1.90 cm  (1.8-2.4cm) AoV Area,Vmax: 2.00 cm2 (2.5-4.5cm2)  RIGHT VENTRICLE: RV Basal 4.30 cm RV Mid   3.20 cm RV Major 6.4 cm TAPSE:   12.2 mm RV s'    0.11 m/s  TRICUSPID VALVE/RVSP:          Normal Ranges: Peak TR Velocity:     3.28 m/s RV Syst Pressure:     51 mmHg  (< 30mmHg) IVC Diam:             2.10 cm  PULMONIC VALVE:          Normal Ranges: PV Max Nnamdi:     1.1 m/s  (0.6-0.9m/s) PV Max P.0 mmHg   [id number] Zoraida Mai MD Electronically signed on 2024 at 10:34:57 AM  Wall Scoring  ** Final **        Results from last 7 days   Lab Units 24  1806   HEMOGLOBIN A1C % 6.4*     BNP   Date/Time Value Ref Range Status   2024 06:06  (H) 0 - 99 pg/mL Final       Test Results Pending At Discharge  Pending Labs       No current pending labs.            Hospital Course   Laurie Ozuna is a 84 y.o. Right-handed female with history of afib s/p pacemaker and on eliquis, hypertension, hyperlipidemia, low back pain receiving transforaminal epidural (TFE) steroid injections (L5-S1) presenting after being found down at home by family members who called EMS. Patient was intubated in ED because of respiratory decline. CT head and CTA showed L M1 occlusion with ASPECTS score of 3. NIHSS 28 pre-thrombectomy. TICI 2B. And post thrombectomy NIHSS of 26. rCTH w/malignant L hemispheric infarct w/worsening MLS. On exam, pt w/dilated, unreactive L pupil. Pt transitioned to DNRCC after GOC w/family .     Pt extubated and  11/10 5:30AM.  The patient's family was notified. Patient was DNR Comfort Measures Only at time of death.     Home Medications     Medication List      ASK your doctor about these medications     acetaminophen 500 mg tablet; Commonly known as: Tylenol   amiodarone 200 mg tablet; Commonly known as: Pacerone; Take 1 tablet   (200 mg) by mouth once daily.   atorvastatin 20 mg tablet; Commonly known as: Lipitor; Take 1 tablet (20   mg) by mouth once daily.   bisacodyl 10 mg suppository; Commonly known as: Dulcolax (bisacodyl);   Insert 1 suppository (10 mg) into the rectum once daily.   calcium carbonate 600 mg calcium (1,500 mg) tablet   candesartan 32 mg tablet; Commonly known as: Atacand   Centrum Silver 0.4 mg-300 mcg- 250 mcg; Generic drug: multivitamin with   minerals iron-free   cyanocobalamin (vitamin B-12) 5,000 mcg tablet, sublingual   docusate sodium 100 mg capsule; Commonly known  as: Colace   * Eliquis 5 mg tablet; Generic drug: apixaban; Take 1 tablet (5 mg) by   mouth 2 times a day.   * Eliquis 5 mg tablet; Generic drug: apixaban; TAKE 1 TABLET BY MOUTH   TWICE DAILY   estradiol 0.01 % (0.1 mg/gram) vaginal cream; Commonly known as: Estrace   levothyroxine 75 mcg tablet; Commonly known as: Synthroid, Levoxyl; Take   1 tablet (75 mcg) by mouth once daily in the morning. Take before meals.   Lidocaine Pain Relief 4 % patch; Generic drug: lidocaine   melatonin 10 mg tablet   metoprolol succinate XL 25 mg 24 hr tablet; Commonly known as:   Toprol-XL; Take 1 tablet (25 mg) by mouth once daily.   potassium chloride CR 20 mEq ER tablet; Commonly known as: Klor-Con M20;   TAKE 1 TABLET(20 MEQ) BY MOUTH ONCE DAILY. DO NOT CRUSH OR CHEW   torsemide 20 mg tablet; Commonly known as: Demadex; Take 2 tablets on   M,W,F and 1.5 tablets the other days of the week  * This list has 2 medication(s) that are the same as other medications   prescribed for you. Read the directions carefully, and ask your doctor or   other care provider to review them with you.       Pertinent Physical Exam At Time of Discharge  Physical Exam  Neurological Exam    No spontaneous movements were present. There was no response to verbal nor tactile stimuli. Pupils were mid-dilated and fixed. No breath sounds were appreciated over either lung field. No carotid pulses were palpable. No heart sounds were auscultated over entire precordium. The patient was pronounced dead at 05:30 on 11/10/2024 .     Outpatient Follow-Up  Future Appointments   Date Time Provider Department Center   12/16/2024 11:00 AM CHERYL Cox [id number] [facility]   2/6/2025 12:40 PM Lenard Stout MD [id number] [facility]   4/16/2025 10:30 AM CHERYL Collazo [id number] [place]   6/11/2025 10:30 AM Ryan Landin MD [id number] [facility]       Steffi José MD

## 2024-11-10 NOTE — SIGNIFICANT EVENT
I was called to patient’s bedside to pronounce that Ms. Laurie Ozuna has . No spontaneous movements were present. There was no response to verbal nor tactile stimuli. Pupils were mid-dilated and fixed. No breath sounds were appreciated over either lung field. No carotid pulses were palpable. No heart sounds were auscultated over entire precordium. The patient was pronounced dead at 05:30 on 11/10/2024 . The patient's family was notified. Patient was DNR Comfort Measures Only at time of death.

## 2024-11-10 NOTE — CARE PLAN
The patient's goals for the shift include      The clinical goals for the shift include pt to remain neurologically stable through end of shift      Problem: Pain  Goal: Takes deep breaths with improved pain control throughout the shift  Outcome: Progressing  Goal: Walks with improved pain control throughout the shift  Outcome: Progressing     Problem: Fall/Injury  Goal: Not fall by end of shift  Outcome: Progressing  Goal: Be free from injury by end of the shift  Outcome: Progressing     Problem: Pain - Adult  Goal: Verbalizes/displays adequate comfort level or baseline comfort level  Outcome: Progressing     Problem: Safety - Adult  Goal: Free from fall injury  Outcome: Progressing     Problem: Chronic Conditions and Co-morbidities  Goal: Patient's chronic conditions and co-morbidity symptoms are monitored and maintained or improved  Outcome: Progressing     Problem: Skin  Goal: Prevent/manage excess moisture  Outcome: Progressing  Goal: Prevent/minimize sheer/friction injuries  Outcome: Progressing

## 2024-11-11 LAB
AORTIC VALVE PEAK VELOCITY: 1.36 M/S
AV PEAK GRADIENT: 7 MMHG
AVA (PEAK VEL): 2 CM2
BODY SURFACE AREA: 2.07 M2
EJECTION FRACTION APICAL 4 CHAMBER: 58.8
EJECTION FRACTION: 60 %
LEFT ATRIUM VOLUME AREA LENGTH INDEX BSA: 26.5 ML/M2
LEFT VENTRICLE INTERNAL DIMENSION DIASTOLE: 4.7 CM (ref 3.5–6)
LEFT VENTRICULAR OUTFLOW TRACT DIAMETER: 1.9 CM
RIGHT VENTRICLE FREE WALL PEAK S': 10.7 CM/S
RIGHT VENTRICLE PEAK SYSTOLIC PRESSURE: 51 MMHG
TRICUSPID ANNULAR PLANE SYSTOLIC EXCURSION: 1.2 CM

## 2024-11-12 ENCOUNTER — APPOINTMENT (OUTPATIENT)
Dept: PRIMARY CARE | Facility: CLINIC | Age: 84
End: 2024-11-12
Payer: COMMERCIAL

## 2024-11-12 NOTE — SIGNIFICANT EVENT
Death Within 24 Hours of Soft Wrist Restraint Utilization    Death within 24 hours of soft wrist restraint logged at 11/12/2024 at 3:02 PM.    Gabby Talamantes RN  Date: 11/12/2024  Time: 3:02 PM

## 2025-02-06 ENCOUNTER — APPOINTMENT (OUTPATIENT)
Dept: PRIMARY CARE | Facility: CLINIC | Age: 85
End: 2025-02-06
Payer: COMMERCIAL

## 2025-04-16 ENCOUNTER — APPOINTMENT (OUTPATIENT)
Dept: CARDIOLOGY | Facility: CLINIC | Age: 85
End: 2025-04-16
Payer: COMMERCIAL

## 2025-06-11 ENCOUNTER — APPOINTMENT (OUTPATIENT)
Dept: OPHTHALMOLOGY | Facility: CLINIC | Age: 85
End: 2025-06-11
Payer: COMMERCIAL